# Patient Record
Sex: FEMALE | Race: WHITE | NOT HISPANIC OR LATINO | Employment: OTHER | URBAN - METROPOLITAN AREA
[De-identification: names, ages, dates, MRNs, and addresses within clinical notes are randomized per-mention and may not be internally consistent; named-entity substitution may affect disease eponyms.]

---

## 2022-08-15 ENCOUNTER — TRANSCRIBE ORDERS (OUTPATIENT)
Dept: ADMINISTRATIVE | Facility: HOSPITAL | Age: 72
End: 2022-08-15

## 2022-08-15 ENCOUNTER — HOSPITAL ENCOUNTER (OUTPATIENT)
Dept: CT IMAGING | Facility: HOSPITAL | Age: 72
Discharge: HOME | End: 2022-08-15

## 2022-08-15 ENCOUNTER — APPOINTMENT (OUTPATIENT)
Dept: GENERAL RADIOLOGY | Facility: HOSPITAL | Age: 72
End: 2022-08-15

## 2022-08-15 DIAGNOSIS — R06.02 SHORTNESS OF BREATH: Primary | ICD-10-CM

## 2022-08-15 DIAGNOSIS — R06.02 SHORTNESS OF BREATH: ICD-10-CM

## 2022-08-15 LAB
ALBUMIN SERPL-MCNC: 4.2 G/DL (ref 3.5–5.2)
ALBUMIN/GLOB SERPL: 1.1 G/DL
ALP SERPL-CCNC: 53 U/L (ref 39–117)
ALT SERPL W P-5'-P-CCNC: 10 U/L (ref 1–33)
ANION GAP SERPL CALCULATED.3IONS-SCNC: 12 MMOL/L (ref 5–15)
AST SERPL-CCNC: 15 U/L (ref 1–32)
BASOPHILS # BLD AUTO: 0.09 10*3/MM3 (ref 0–0.2)
BASOPHILS NFR BLD AUTO: 0.7 % (ref 0–1.5)
BILIRUB SERPL-MCNC: 0.2 MG/DL (ref 0–1.2)
BUN SERPL-MCNC: 48 MG/DL (ref 8–23)
BUN/CREAT SERPL: 21.2 (ref 7–25)
CALCIUM SPEC-SCNC: 10.3 MG/DL (ref 8.6–10.5)
CHLORIDE SERPL-SCNC: 101 MMOL/L (ref 98–107)
CO2 SERPL-SCNC: 25 MMOL/L (ref 22–29)
CREAT SERPL-MCNC: 2.26 MG/DL (ref 0.57–1)
DEPRECATED RDW RBC AUTO: 39 FL (ref 37–54)
EGFRCR SERPLBLD CKD-EPI 2021: 22.7 ML/MIN/1.73
EOSINOPHIL # BLD AUTO: 0.05 10*3/MM3 (ref 0–0.4)
EOSINOPHIL NFR BLD AUTO: 0.4 % (ref 0.3–6.2)
ERYTHROCYTE [DISTWIDTH] IN BLOOD BY AUTOMATED COUNT: 11.9 % (ref 12.3–15.4)
GLOBULIN UR ELPH-MCNC: 3.7 GM/DL
GLUCOSE SERPL-MCNC: 134 MG/DL (ref 65–99)
HCT VFR BLD AUTO: 37.1 % (ref 34–46.6)
HGB BLD-MCNC: 12.4 G/DL (ref 12–15.9)
HOLD SPECIMEN: NORMAL
HOLD SPECIMEN: NORMAL
IMM GRANULOCYTES # BLD AUTO: 0.03 10*3/MM3 (ref 0–0.05)
IMM GRANULOCYTES NFR BLD AUTO: 0.2 % (ref 0–0.5)
LYMPHOCYTES # BLD AUTO: 5.02 10*3/MM3 (ref 0.7–3.1)
LYMPHOCYTES NFR BLD AUTO: 38.6 % (ref 19.6–45.3)
MAGNESIUM SERPL-MCNC: 1.8 MG/DL (ref 1.6–2.4)
MCH RBC QN AUTO: 30 PG (ref 26.6–33)
MCHC RBC AUTO-ENTMCNC: 33.4 G/DL (ref 31.5–35.7)
MCV RBC AUTO: 89.8 FL (ref 79–97)
MONOCYTES # BLD AUTO: 0.94 10*3/MM3 (ref 0.1–0.9)
MONOCYTES NFR BLD AUTO: 7.2 % (ref 5–12)
NEUTROPHILS NFR BLD AUTO: 52.9 % (ref 42.7–76)
NEUTROPHILS NFR BLD AUTO: 6.87 10*3/MM3 (ref 1.7–7)
NRBC BLD AUTO-RTO: 0 /100 WBC (ref 0–0.2)
PLATELET # BLD AUTO: 474 10*3/MM3 (ref 140–450)
PMV BLD AUTO: 8.5 FL (ref 6–12)
POTASSIUM SERPL-SCNC: 4.8 MMOL/L (ref 3.5–5.2)
PROT SERPL-MCNC: 7.9 G/DL (ref 6–8.5)
RBC # BLD AUTO: 4.13 10*6/MM3 (ref 3.77–5.28)
SODIUM SERPL-SCNC: 138 MMOL/L (ref 136–145)
TROPONIN T SERPL-MCNC: <0.01 NG/ML (ref 0–0.03)
WBC NRBC COR # BLD: 13 10*3/MM3 (ref 3.4–10.8)
WHOLE BLOOD HOLD COAG: NORMAL
WHOLE BLOOD HOLD SPECIMEN: NORMAL

## 2022-08-15 PROCEDURE — 83735 ASSAY OF MAGNESIUM: CPT

## 2022-08-15 PROCEDURE — 99284 EMERGENCY DEPT VISIT MOD MDM: CPT

## 2022-08-15 PROCEDURE — 80053 COMPREHEN METABOLIC PANEL: CPT

## 2022-08-15 PROCEDURE — 85025 COMPLETE CBC W/AUTO DIFF WBC: CPT

## 2022-08-15 PROCEDURE — 84484 ASSAY OF TROPONIN QUANT: CPT

## 2022-08-15 PROCEDURE — 71045 X-RAY EXAM CHEST 1 VIEW: CPT

## 2022-08-15 PROCEDURE — 93005 ELECTROCARDIOGRAM TRACING: CPT | Performed by: EMERGENCY MEDICINE

## 2022-08-15 PROCEDURE — 93005 ELECTROCARDIOGRAM TRACING: CPT

## 2022-08-15 PROCEDURE — 82565 ASSAY OF CREATININE: CPT

## 2022-08-15 PROCEDURE — 71250 CT THORAX DX C-: CPT

## 2022-08-15 RX ORDER — SODIUM CHLORIDE 0.9 % (FLUSH) 0.9 %
10 SYRINGE (ML) INJECTION AS NEEDED
Status: DISCONTINUED | OUTPATIENT
Start: 2022-08-15 | End: 2022-08-17 | Stop reason: HOSPADM

## 2022-08-16 ENCOUNTER — APPOINTMENT (OUTPATIENT)
Dept: NUCLEAR MEDICINE | Facility: HOSPITAL | Age: 72
End: 2022-08-16

## 2022-08-16 ENCOUNTER — HOSPITAL ENCOUNTER (OUTPATIENT)
Facility: HOSPITAL | Age: 72
Setting detail: OBSERVATION
LOS: 1 days | Discharge: HOME | End: 2022-08-17
Attending: EMERGENCY MEDICINE | Admitting: INTERNAL MEDICINE

## 2022-08-16 DIAGNOSIS — N17.9 AKI (ACUTE KIDNEY INJURY): Primary | ICD-10-CM

## 2022-08-16 PROBLEM — C34.90 LUNG CANCER: Status: ACTIVE | Noted: 2022-08-16

## 2022-08-16 PROBLEM — I10 ESSENTIAL HYPERTENSION: Status: ACTIVE | Noted: 2022-08-16

## 2022-08-16 PROBLEM — R79.89 ELEVATED D-DIMER: Status: ACTIVE | Noted: 2022-08-16

## 2022-08-16 PROBLEM — K21.9 GERD WITHOUT ESOPHAGITIS: Status: ACTIVE | Noted: 2022-08-16

## 2022-08-16 PROBLEM — I25.10 CORONARY ARTERY DISEASE: Status: ACTIVE | Noted: 2022-08-16

## 2022-08-16 PROBLEM — R91.8 LUNG MASS: Status: ACTIVE | Noted: 2022-08-16

## 2022-08-16 PROBLEM — E11.9 TYPE 2 DIABETES MELLITUS: Status: ACTIVE | Noted: 2022-08-16

## 2022-08-16 PROBLEM — R91.1 LUNG NODULE: Status: ACTIVE | Noted: 2022-08-16

## 2022-08-16 PROBLEM — R53.83 FATIGUE: Status: ACTIVE | Noted: 2022-08-16

## 2022-08-16 PROBLEM — E78.5 HYPERLIPIDEMIA: Status: ACTIVE | Noted: 2022-08-16

## 2022-08-16 LAB
ANION GAP SERPL CALCULATED.3IONS-SCNC: 11 MMOL/L (ref 5–15)
BACTERIA UR QL AUTO: ABNORMAL /HPF
BASOPHILS # BLD AUTO: 0.08 10*3/MM3 (ref 0–0.2)
BASOPHILS NFR BLD AUTO: 0.7 % (ref 0–1.5)
BILIRUB UR QL STRIP: NEGATIVE
BUN SERPL-MCNC: 41 MG/DL (ref 8–23)
BUN/CREAT SERPL: 22.8 (ref 7–25)
CALCIUM SPEC-SCNC: 9.5 MG/DL (ref 8.6–10.5)
CHLORIDE SERPL-SCNC: 101 MMOL/L (ref 98–107)
CLARITY UR: CLEAR
CO2 SERPL-SCNC: 23 MMOL/L (ref 22–29)
COLOR UR: YELLOW
CREAT SERPL-MCNC: 1.8 MG/DL (ref 0.57–1)
CREAT UR-MCNC: 82.1 MG/DL
D-LACTATE SERPL-SCNC: 1.5 MMOL/L (ref 0.5–2)
DEPRECATED RDW RBC AUTO: 39.2 FL (ref 37–54)
EGFRCR SERPLBLD CKD-EPI 2021: 29.8 ML/MIN/1.73
EOSINOPHIL # BLD AUTO: 0.03 10*3/MM3 (ref 0–0.4)
EOSINOPHIL NFR BLD AUTO: 0.3 % (ref 0.3–6.2)
ERYTHROCYTE [DISTWIDTH] IN BLOOD BY AUTOMATED COUNT: 11.8 % (ref 12.3–15.4)
FLUAV SUBTYP SPEC NAA+PROBE: NOT DETECTED
FLUBV RNA ISLT QL NAA+PROBE: NOT DETECTED
GLUCOSE BLDC GLUCOMTR-MCNC: 127 MG/DL (ref 70–130)
GLUCOSE BLDC GLUCOMTR-MCNC: 134 MG/DL (ref 70–130)
GLUCOSE BLDC GLUCOMTR-MCNC: 137 MG/DL (ref 70–130)
GLUCOSE SERPL-MCNC: 147 MG/DL (ref 65–99)
GLUCOSE UR STRIP-MCNC: NEGATIVE MG/DL
HBA1C MFR BLD: 6.9 % (ref 4.8–5.6)
HCT VFR BLD AUTO: 34.7 % (ref 34–46.6)
HGB BLD-MCNC: 11.5 G/DL (ref 12–15.9)
HGB UR QL STRIP.AUTO: NEGATIVE
HOLD SPECIMEN: NORMAL
HYALINE CASTS UR QL AUTO: ABNORMAL /LPF
IMM GRANULOCYTES # BLD AUTO: 0.05 10*3/MM3 (ref 0–0.05)
IMM GRANULOCYTES NFR BLD AUTO: 0.5 % (ref 0–0.5)
KETONES UR QL STRIP: NEGATIVE
LEUKOCYTE ESTERASE UR QL STRIP.AUTO: ABNORMAL
LYMPHOCYTES # BLD AUTO: 4.21 10*3/MM3 (ref 0.7–3.1)
LYMPHOCYTES NFR BLD AUTO: 37.9 % (ref 19.6–45.3)
MCH RBC QN AUTO: 30.1 PG (ref 26.6–33)
MCHC RBC AUTO-ENTMCNC: 33.1 G/DL (ref 31.5–35.7)
MCV RBC AUTO: 90.8 FL (ref 79–97)
MONOCYTES # BLD AUTO: 0.79 10*3/MM3 (ref 0.1–0.9)
MONOCYTES NFR BLD AUTO: 7.1 % (ref 5–12)
NEUTROPHILS NFR BLD AUTO: 5.94 10*3/MM3 (ref 1.7–7)
NEUTROPHILS NFR BLD AUTO: 53.5 % (ref 42.7–76)
NITRITE UR QL STRIP: NEGATIVE
NRBC BLD AUTO-RTO: 0 /100 WBC (ref 0–0.2)
PH UR STRIP.AUTO: <=5 [PH] (ref 5–8)
PLATELET # BLD AUTO: 414 10*3/MM3 (ref 140–450)
PMV BLD AUTO: 8.6 FL (ref 6–12)
POTASSIUM SERPL-SCNC: 4.6 MMOL/L (ref 3.5–5.2)
PROCALCITONIN SERPL-MCNC: 0.04 NG/ML (ref 0–0.25)
PROT UR QL STRIP: NEGATIVE
QT INTERVAL: 366 MS
QTC INTERVAL: 442 MS
RBC # BLD AUTO: 3.82 10*6/MM3 (ref 3.77–5.28)
RBC # UR STRIP: ABNORMAL /HPF
REF LAB TEST METHOD: ABNORMAL
SARS-COV-2 RNA PNL SPEC NAA+PROBE: NOT DETECTED
SODIUM SERPL-SCNC: 135 MMOL/L (ref 136–145)
SODIUM UR-SCNC: 46 MMOL/L
SP GR UR STRIP: 1.01 (ref 1–1.03)
SQUAMOUS #/AREA URNS HPF: ABNORMAL /HPF
TROPONIN T SERPL-MCNC: <0.01 NG/ML (ref 0–0.03)
TSH SERPL DL<=0.05 MIU/L-ACNC: 0.87 UIU/ML (ref 0.27–4.2)
UROBILINOGEN UR QL STRIP: ABNORMAL
UUN 24H UR-MCNC: 559 MG/DL
WBC # UR STRIP: ABNORMAL /HPF
WBC NRBC COR # BLD: 11.1 10*3/MM3 (ref 3.4–10.8)

## 2022-08-16 PROCEDURE — 78580 LUNG PERFUSION IMAGING: CPT

## 2022-08-16 PROCEDURE — 84484 ASSAY OF TROPONIN QUANT: CPT | Performed by: INTERNAL MEDICINE

## 2022-08-16 PROCEDURE — 93010 ELECTROCARDIOGRAM REPORT: CPT | Performed by: INTERNAL MEDICINE

## 2022-08-16 PROCEDURE — 85025 COMPLETE CBC W/AUTO DIFF WBC: CPT | Performed by: PHYSICIAN ASSISTANT

## 2022-08-16 PROCEDURE — 99219 PR INITIAL OBSERVATION CARE/DAY 50 MINUTES: CPT | Performed by: INTERNAL MEDICINE

## 2022-08-16 PROCEDURE — G0378 HOSPITAL OBSERVATION PER HR: HCPCS

## 2022-08-16 PROCEDURE — 81001 URINALYSIS AUTO W/SCOPE: CPT

## 2022-08-16 PROCEDURE — 82962 GLUCOSE BLOOD TEST: CPT

## 2022-08-16 PROCEDURE — 84540 ASSAY OF URINE/UREA-N: CPT | Performed by: INTERNAL MEDICINE

## 2022-08-16 PROCEDURE — A9540 TC99M MAA: HCPCS | Performed by: INTERNAL MEDICINE

## 2022-08-16 PROCEDURE — 83036 HEMOGLOBIN GLYCOSYLATED A1C: CPT | Performed by: PHYSICIAN ASSISTANT

## 2022-08-16 PROCEDURE — 0 TECHNETIUM ALBUMIN AGGREGATED: Performed by: INTERNAL MEDICINE

## 2022-08-16 PROCEDURE — 93005 ELECTROCARDIOGRAM TRACING: CPT | Performed by: INTERNAL MEDICINE

## 2022-08-16 PROCEDURE — 51798 US URINE CAPACITY MEASURE: CPT

## 2022-08-16 PROCEDURE — 84145 PROCALCITONIN (PCT): CPT | Performed by: PHYSICIAN ASSISTANT

## 2022-08-16 PROCEDURE — 83605 ASSAY OF LACTIC ACID: CPT | Performed by: PHYSICIAN ASSISTANT

## 2022-08-16 PROCEDURE — 82570 ASSAY OF URINE CREATININE: CPT | Performed by: INTERNAL MEDICINE

## 2022-08-16 PROCEDURE — 87636 SARSCOV2 & INF A&B AMP PRB: CPT | Performed by: PHYSICIAN ASSISTANT

## 2022-08-16 PROCEDURE — 97161 PT EVAL LOW COMPLEX 20 MIN: CPT

## 2022-08-16 PROCEDURE — 80048 BASIC METABOLIC PNL TOTAL CA: CPT | Performed by: PHYSICIAN ASSISTANT

## 2022-08-16 PROCEDURE — 84443 ASSAY THYROID STIM HORMONE: CPT | Performed by: INTERNAL MEDICINE

## 2022-08-16 PROCEDURE — 84300 ASSAY OF URINE SODIUM: CPT | Performed by: INTERNAL MEDICINE

## 2022-08-16 RX ORDER — FENOFIBRATE 145 MG/1
145 TABLET, COATED ORAL DAILY
Status: DISCONTINUED | OUTPATIENT
Start: 2022-08-16 | End: 2022-08-16

## 2022-08-16 RX ORDER — NICOTINE POLACRILEX 4 MG
15 LOZENGE BUCCAL
Status: DISCONTINUED | OUTPATIENT
Start: 2022-08-16 | End: 2022-08-16 | Stop reason: SDUPTHER

## 2022-08-16 RX ORDER — DEXTROSE MONOHYDRATE 25 G/50ML
25 INJECTION, SOLUTION INTRAVENOUS
Status: DISCONTINUED | OUTPATIENT
Start: 2022-08-16 | End: 2022-08-16 | Stop reason: SDUPTHER

## 2022-08-16 RX ORDER — ROSUVASTATIN CALCIUM 20 MG/1
20 TABLET, COATED ORAL DAILY
Status: DISCONTINUED | OUTPATIENT
Start: 2022-08-16 | End: 2022-08-17 | Stop reason: HOSPADM

## 2022-08-16 RX ORDER — HYDROCHLOROTHIAZIDE 25 MG/1
25 TABLET ORAL DAILY
COMMUNITY
End: 2022-08-17 | Stop reason: HOSPADM

## 2022-08-16 RX ORDER — PANTOPRAZOLE SODIUM 40 MG/1
40 TABLET, DELAYED RELEASE ORAL DAILY
COMMUNITY

## 2022-08-16 RX ORDER — DEXTROSE MONOHYDRATE 25 G/50ML
25 INJECTION, SOLUTION INTRAVENOUS
Status: DISCONTINUED | OUTPATIENT
Start: 2022-08-16 | End: 2022-08-17 | Stop reason: HOSPADM

## 2022-08-16 RX ORDER — SODIUM CHLORIDE 0.9 % (FLUSH) 0.9 %
10 SYRINGE (ML) INJECTION AS NEEDED
Status: DISCONTINUED | OUTPATIENT
Start: 2022-08-16 | End: 2022-08-17 | Stop reason: HOSPADM

## 2022-08-16 RX ORDER — SODIUM CHLORIDE 0.9 % (FLUSH) 0.9 %
10 SYRINGE (ML) INJECTION EVERY 12 HOURS SCHEDULED
Status: DISCONTINUED | OUTPATIENT
Start: 2022-08-16 | End: 2022-08-17 | Stop reason: HOSPADM

## 2022-08-16 RX ORDER — CHOLECALCIFEROL (VITAMIN D3) 125 MCG
5 CAPSULE ORAL NIGHTLY PRN
Status: DISCONTINUED | OUTPATIENT
Start: 2022-08-16 | End: 2022-08-17 | Stop reason: HOSPADM

## 2022-08-16 RX ORDER — ASPIRIN 81 MG/1
81 TABLET, CHEWABLE ORAL DAILY
Status: DISCONTINUED | OUTPATIENT
Start: 2022-08-16 | End: 2022-08-17 | Stop reason: HOSPADM

## 2022-08-16 RX ORDER — PANTOPRAZOLE SODIUM 40 MG/1
40 TABLET, DELAYED RELEASE ORAL DAILY
Status: DISCONTINUED | OUTPATIENT
Start: 2022-08-16 | End: 2022-08-17 | Stop reason: HOSPADM

## 2022-08-16 RX ORDER — ASPIRIN 81 MG/1
81 TABLET, CHEWABLE ORAL DAILY
COMMUNITY

## 2022-08-16 RX ORDER — LOSARTAN POTASSIUM 50 MG/1
50 TABLET ORAL DAILY
COMMUNITY

## 2022-08-16 RX ORDER — FENOFIBRATE 145 MG/1
145 TABLET, COATED ORAL DAILY
COMMUNITY

## 2022-08-16 RX ORDER — INSULIN LISPRO 100 [IU]/ML
0-9 INJECTION, SOLUTION INTRAVENOUS; SUBCUTANEOUS
Status: DISCONTINUED | OUTPATIENT
Start: 2022-08-16 | End: 2022-08-16 | Stop reason: SDUPTHER

## 2022-08-16 RX ORDER — NICOTINE POLACRILEX 4 MG
15 LOZENGE BUCCAL
Status: DISCONTINUED | OUTPATIENT
Start: 2022-08-16 | End: 2022-08-17 | Stop reason: HOSPADM

## 2022-08-16 RX ORDER — SODIUM CHLORIDE 9 MG/ML
75 INJECTION, SOLUTION INTRAVENOUS CONTINUOUS
Status: DISCONTINUED | OUTPATIENT
Start: 2022-08-16 | End: 2022-08-17

## 2022-08-16 RX ORDER — LEVOFLOXACIN 750 MG/1
750 TABLET ORAL DAILY
COMMUNITY
End: 2022-08-17 | Stop reason: HOSPADM

## 2022-08-16 RX ORDER — ROSUVASTATIN CALCIUM 20 MG/1
20 TABLET, COATED ORAL DAILY
COMMUNITY

## 2022-08-16 RX ORDER — ACETAMINOPHEN 325 MG/1
650 TABLET ORAL EVERY 4 HOURS PRN
Status: DISCONTINUED | OUTPATIENT
Start: 2022-08-16 | End: 2022-08-17 | Stop reason: HOSPADM

## 2022-08-16 RX ORDER — TAMSULOSIN HYDROCHLORIDE 0.4 MG/1
1 CAPSULE ORAL DAILY
COMMUNITY

## 2022-08-16 RX ORDER — INSULIN LISPRO 100 [IU]/ML
0-7 INJECTION, SOLUTION INTRAVENOUS; SUBCUTANEOUS
Status: DISCONTINUED | OUTPATIENT
Start: 2022-08-16 | End: 2022-08-17 | Stop reason: HOSPADM

## 2022-08-16 RX ADMIN — PANTOPRAZOLE SODIUM 40 MG: 40 TABLET, DELAYED RELEASE ORAL at 09:18

## 2022-08-16 RX ADMIN — KIT FOR THE PREPARATION OF TECHNETIUM TC 99M ALBUMIN AGGREGATED 1 DOSE: 2.5 INJECTION, POWDER, FOR SOLUTION INTRAVENOUS at 10:24

## 2022-08-16 RX ADMIN — ASPIRIN 81 MG CHEWABLE TABLET 81 MG: 81 TABLET CHEWABLE at 09:18

## 2022-08-16 RX ADMIN — ROSUVASTATIN CALCIUM 20 MG: 20 TABLET, FILM COATED ORAL at 09:18

## 2022-08-16 RX ADMIN — SODIUM CHLORIDE 75 ML/HR: 9 INJECTION, SOLUTION INTRAVENOUS at 04:57

## 2022-08-16 RX ADMIN — SODIUM CHLORIDE 1000 ML: 9 INJECTION, SOLUTION INTRAVENOUS at 01:59

## 2022-08-16 NOTE — PROGRESS NOTES
[facility]  ADMISSION FOLLOW-UP NOTE          Patient admitted after midnight, H&P by my partner performed earlier on today's date reviewed.  Interim findings, labs, and charting also reviewed.        The [facility] Problem List has been managed and updated to include any new diagnoses:  Active Hospital Problems    Diagnosis  POA   • **MORENITA (acute kidney injury) (HCC) [N17.9]  Yes     Priority: Medium   • Type 2 diabetes mellitus (HCC) [E11.9]  Yes   • Essential hypertension [I10]  Yes   • Hyperlipidemia [E78.5]  Yes   • Elevated d-dimer [R79.89]  Yes   • Coronary artery disease [I25.10]  Yes   • Lung cancer (HCC) [C34.90]  No   • Fatigue [R53.83]  Yes   • Lung nodule seen on CT 8/15/22, rec 3 month f/u  [R91.1]  Yes      Resolved Hospital Problems   No resolved problems to display.         ADDITIONAL PLAN:  - detailed assessment and plan from admission reviewed  -See history and physical dated today for full details.  She is a 71-year-old female with a history of type 2 diabetes, hypertension, prior lung cancer status post right middle lobectomy who presented from primary care office with report of acute renal failure and elevated D-dimer.  Recently placed on Levaquin for possible UTI (data deficit) and took 4 doses but not her fifth because she had some associated nausea and vomiting.  Upon reevaluation by her primary care physician she was noted to have some fatigue and possible shortness of breath and sounds like blood work was checked which showed a creatinine of 3 and an elevated D-dimer and was sent to the emergency room.  -On arrival here creatinine was 2.2, and is down to 1.8 today after some IV fluids.  D-dimer minimally elevated at 0.53.  My clinical suspicion is low for VTE, however is reasonable to proceed with VQ scan to rule out.  -Plan will be to continue IV fluids and get VQ scan today, recheck BMP in the morning.  I think okay to cancel  echocardiogram.  Plan discharge in the morning if continues to feel well and creatinine is further normalized.  -Discussed with patient and , all questions answered.    Albert Conde MD  08/16/22

## 2022-08-16 NOTE — PROGRESS NOTES
"                    Clinical Nutrition       Patient Name: Laina Thompson  YOB: 1950  MRN: [id number]  Date of Encounter: 08/16/22 15:56 EDT  Admission date: 8/16/2022      Reason for Visit   Identified at risk by screening criteria, MST score 2+      EMR  Reviewed   Yes    Height: Height: 167.6 cm (66\")  Weight: Weight: 68 kg (150 lb) (08/15/22 2005)  BMI: BMI (Calculated): 24.2    Problem:    MORENITA (acute kidney injury) (HCC)    Type 2 diabetes mellitus (HCC)    Essential hypertension    Hyperlipidemia    Elevated d-dimer    Coronary artery disease    Lung cancer (HCC)    Fatigue    Lung nodule seen on CT 8/15/22, rec 3 month f/u        Reported/Observed/Food/Nutrition Related - Comments     Patient admitted due to acute renal failure and elevated D-dimer.  Poor intake, nausea and vomiting x 1 week while of abx for recent UTI.  Patient reports nausea and vomiting have subsided. She tolerated her lunch meal.  Kitchen staff has been reviewing menu options with patient.  Denies any recent weight changes.      Current Nutrition Prescription     Diet Regular; Consistent Carbohydrate  No active supplement orders    Average Intake from Charting: isud data     Nutrition Diagnosis     Problem No nutrition diagnosis at this time   Etiology    Signs/Symptoms    Status:    Actions     Follow treatment progress, Care plan reviewed, Interview for preferences, Menu provided    Monitor Per Protocol      Lizbet Pisano RD,   Time Spent: 15min          "

## 2022-08-16 NOTE — THERAPY DISCHARGE NOTE
Patient Name: Laina Thompson  : 1950    MRN: [id number]                              Today's Date: 2022       Admit Date: 2022    Visit Dx:     ICD-10-CM ICD-9-CM   1. MORENITA (acute kidney injury) (HCC)  N17.9 584.9     Patient Active Problem List   Diagnosis   • Type 2 diabetes mellitus (HCC)   • Essential hypertension   • Hyperlipidemia   • GERD without esophagitis   • MORENITA (acute kidney injury) (HCC)   • Elevated d-dimer   • Coronary artery disease   • Lung cancer (HCC)   • Fatigue   • Lung nodule seen on CT 8/15/22, rec 3 month f/u      Past Medical History:   Diagnosis Date   • Diabetes (HCC)    • GERD (gastroesophageal reflux disease)    • Hypertension      Past Surgical History:   Procedure Laterality Date   • APPENDECTOMY     • CHOLECYSTECTOMY     • HYSTERECTOMY     • LUNG LOBECTOMY        General Information     Row Name 22 1733          Physical Therapy Time and Intention    Document Type discharge evaluation/summary  -     Mode of Treatment physical therapy  -     Row Name 22 1733          General Information    Patient Profile Reviewed yes  -     Prior Level of Function independent:;all household mobility;community mobility;gait;transfer;bed mobility;ADL's  -     Existing Precautions/Restrictions no known precautions/restrictions  -     Barriers to Rehab none identified  -     Row Name 22 1733          Living Environment    People in Home spouse  -HP     Row Name 22 1733          Home Main Entrance    Number of Stairs, Main Entrance none  -HP     Row Name 22 1733          Stairs Within Home, Primary    Number of Stairs, Within Home, Primary twelve  -     Row Name 22 1733          Cognition    Orientation Status (Cognition) oriented x 3  -HP     Row Name 22 1733          Safety Issues, Functional Mobility    Safety Issues Affecting Function (Mobility) awareness of need for assistance  -           User Key  (r) = Recorded By, (t) =  Taken By, (c) = Cosigned By    Initials Name Provider Type     Jenelle Diaz PT Physical Therapist               Mobility     Row Name 08/16/22 1734          Bed Mobility    Bed Mobility supine-sit;sit-supine  -HP     Supine-Sit [place] (Bed Mobility) modified independence  -HP     Sit-Supine [place] (Bed Mobility) modified independence  -     Row Name 08/16/22 1734          Sit-Stand Transfer    Sit-Stand [place] (Transfers) standby assist  -HP     Comment, (Sit-Stand Transfer) for safety only  -     Row Name 08/16/22 1734          Gait/Stairs (Locomotion)    [place] Level (Gait) standby assist  -HP     Distance in Feet (Gait) 500  -     Comment, (Gait/Stairs) SBA for safety only. No LOB or unsteadiness noted  -           User Key  (r) = Recorded By, (t) = Taken By, (c) = Cosigned By    Initials Name Provider Type     Jenelle Diaz PT Physical Therapist               Obj/Interventions     Row Name 08/16/22 1735          Balance    Balance Assessment sitting static balance;sitting dynamic balance;sit to stand dynamic balance;standing static balance;standing dynamic balance  -     Static Sitting Balance modified independence  -     Dynamic Sitting Balance modified independence  -     Static Standing Balance standby assist  -     Dynamic Standing Balance standby assist  -     Balance Interventions sitting;standing;sit to stand;occupation based/functional task  -           User Key  (r) = Recorded By, (t) = Taken By, (c) = Cosigned By    Initials Name Provider Type     Jenelle Diaz PT Physical Therapist               Goals/Plan    No documentation.                Clinical Impression     Row Name 08/16/22 1735          Pain    Pretreatment Pain Rating 0/10 - no pain  -     Posttreatment Pain Rating 0/10 - no pain  -     Row Name 08/16/22 1735          Plan of Care Review    Plan of Care Reviewed With patient  -     Progress no change  -     Outcome Evaluation  PT eval complete. Pt demonstrated ability to safely perform bed mobility, amb 500', and navigate 5 steps with no safety concerns. No LOB noted. IPPT services not warranted at this time. Will d/c services. Please reconsult if change in functional mobility occurs. Recommend d/c home with assist.  -     Row Name 08/16/22 1735          Therapy Assessment/Plan (PT)    Criteria for Skilled Interventions Met (PT) no;no problems identified which require skilled intervention  -     Row Name 08/16/22 1735          Vital Signs    Pre Systolic BP Rehab --  VSS  -HP     Pre Patient Position Supine  -HP     Intra Patient Position Standing  -HP     Post Patient Position Supine  -HP     Row Name 08/16/22 1735          Positioning and Restraints    Pre-Treatment Position in bed  -HP     Post Treatment Position bed  -HP     In Bed notified nsg;supine;fowlers;call light within reach;encouraged to call for assist;exit alarm on;with family/caregiver;side rails up x2  -HP           User Key  (r) = Recorded By, (t) = Taken By, (c) = Cosigned By    Initials Name Provider Type    HP Jenelle Diaz, PT Physical Therapist               Outcome Measures     Row Name 08/16/22 1737          How much help from another person do you currently need...    Turning from your back to your side while in flat bed without using bedrails? 4  -HP     Moving from lying on back to sitting on the side of a flat bed without bedrails? 4  -HP     Moving to and from a bed to a chair (including a wheelchair)? 4  -HP     Standing up from a chair using your arms (e.g., wheelchair, bedside chair)? 4  -HP     Climbing 3-5 steps with a railing? 4  -HP     To walk in hospital room? 4  -HP     AM-PAC 6 Clicks Score (PT) 24  -HP     Highest level of mobility 8 --> Walked 250 feet or more  -     Row Name 08/16/22 6737          Functional Assessment    Outcome Measure Options AM-PAC 6 Clicks Basic Mobility (PT)  -HP           User Key  (r) = Recorded By, (t) = Taken  By, (c) = Cosigned By    Initials Name Provider Type     Jenelle Diaz PT Physical Therapist              Physical Therapy Education                 Title: PT OT SLP Therapies (Done)     Topic: Physical Therapy (Done)     Point: Mobility training (Done)     Learning Progress Summary           Patient Acceptance, E,D, VU,DU by  at 8/16/2022 1737   Family Acceptance, E,D, VU,DU by  at 8/16/2022 1737                   Point: Home exercise program (Done)     Learning Progress Summary           Patient Acceptance, E,D, VU,DU by  at 8/16/2022 1737   Family Acceptance, E,D, VU,DU by  at 8/16/2022 1737                   Point: Body mechanics (Done)     Learning Progress Summary           Patient Acceptance, E,D, VU,DU by  at 8/16/2022 1737   Family Acceptance, E,D, VU,DU by  at 8/16/2022 1737                   Point: Precautions (Done)     Learning Progress Summary           Patient Acceptance, E,D, VU,DU by  at 8/16/2022 1737   Family Acceptance, E,D, VU,DU by  at 8/16/2022 1737                               User Key     Initials Effective Dates Name Provider Type Discipline     06/01/21 -  Jenelle Diaz PT Physical Therapist PT              PT Recommendation and Plan     Plan of Care Reviewed With: patient  Progress: no change  Outcome Evaluation: PT eval complete. Pt demonstrated ability to safely perform bed mobility, amb 500', and navigate 5 steps with no safety concerns. No LOB noted. IPPT services not warranted at this time. Will d/c services. Please reconsult if change in functional mobility occurs. Recommend d/c home with assist.     Time Calculation:    PT Charges     Row Name 08/16/22 1715             Time Calculation    Start Time 1715  -HP      PT Received On 08/16/22  -HP      PT Goal Re-Cert Due Date 08/26/22  -HP              Untimed Charges    PT Eval/Re-eval Minutes 40  -HP              Total Minutes    Untimed Charges Total Minutes 40  -HP       Total Minutes 40  -HP             User Key  (r) = Recorded By, (t) = Taken By, (c) = Cosigned By    Initials Name Provider Type     Jenelle Diaz, PT Physical Therapist              Therapy Charges for Today     Code Description Service Date Service Provider Modifiers Qty    [id number] HC PT EVAL LOW COMPLEXITY 3 8/16/2022 Jenelle Diaz, LUCIAN GP 1          PT G-Codes  Outcome Measure Options: AM-PAC 6 Clicks Basic Mobility (PT)  AM-PAC 6 Clicks Score (PT): 24    PT Discharge Summary  Anticipated Discharge Disposition (PT): home with assist    Jenelle Diaz PT  8/16/2022

## 2022-08-16 NOTE — PLAN OF CARE
Goal Outcome Evaluation:           Progress: improving  Outcome Evaluation: VSS, RA, Alert and oriented x 4, no c/o of dizziness and gait is steady.  IVFs continued.  Will continue to monitor and will notify MD STEEN.  Jessika Gibbons RN

## 2022-08-16 NOTE — PLAN OF CARE
Goal Outcome Evaluation:  Plan of Care Reviewed With: patient        Progress: no change  Outcome Evaluation: PT eval complete. Pt demonstrated ability to safely perform bed mobility, amb 500', and navigate 5 steps with no safety concerns. No LOB noted. IPPT services not warranted at this time. Will d/c services. Please reconsult if change in functional mobility occurs. Recommend d/c home with assist.

## 2022-08-16 NOTE — H&P
[facility] Medicine Services  HISTORY AND PHYSICAL    Patient Name: Laina Thompson  : 1950  MRN: [id number]  Primary Care Physician: Graciela Hargrove MD  Date of admission: 2022      Subjective   Subjective     Chief Complaint:  Fatigue, dizziness, SOB       HPI:  Laina Thompson is a 71 y.o. female with a history of CAD, lung cancer s/p RLL lobectomy, T2DM, HTN, HLD, and GERD who presents to [facility] ED for complaint of fatigue and dizziness.    She was seen by PCP last week and dx's with UTI, and was placed on levaquin. She states she almost completed the full course, but was taken off it with one day left d/t continued nausea and vomiting while on it. She presented back to pcp with weakness, fatigue, SOB, and tachycardia. Labs at that time showed an elevated D-dimer. CTA was ordered but unable to be obtained due to elevated creatinine of around 3 which is new for her (last cr on 22 was 1.1). She was then advised to come here for further evaluation. She is chest pain free at this time. Denies fever, chills, cough, abd pain, nausea, vomiting or diarrhea.  She does however state that she has been more short of breath over the last several days, and tends to be worse with exertion.           Review of Systems   Constitutional: Positive for fatigue. Negative for chills, fever and unexpected weight change.   HENT: Negative for nosebleeds, postnasal drip, rhinorrhea, sinus pressure and trouble swallowing.    Eyes: Negative for photophobia and visual disturbance.   Respiratory: Positive for shortness of breath. Negative for cough and wheezing.    Cardiovascular: Negative for chest pain, palpitations and leg swelling.   Gastrointestinal: Negative for abdominal pain, diarrhea, nausea and vomiting.   Genitourinary: Negative for dysuria and hematuria.   Musculoskeletal: Negative for arthralgias and myalgias.   Skin: Negative.    Neurological: Positive for dizziness and  headaches. Negative for tremors, syncope, speech difficulty and weakness.   Psychiatric/Behavioral: Negative for confusion. The patient is not nervous/anxious.          All other systems reviewed and are negative.     Personal History     Past Medical History:   Diagnosis Date   • Diabetes (HCC)    • GERD (gastroesophageal reflux disease)    • Hypertension              Past Surgical History:   Procedure Laterality Date   • APPENDECTOMY     • CHOLECYSTECTOMY     • HYSTERECTOMY     • LUNG LOBECTOMY         Family History:  family history includes Cancer in her father; Diabetes in her father; Heart disease in her father; Hyperlipidemia in her father. Otherwise pertinent FHx was reviewed and unremarkable.     Social History:    Social History     Social History Narrative   • Not on file       Medications:  Available home medication information reviewed.  Medications Prior to Admission   Medication Sig Dispense Refill Last Dose   • aspirin 81 MG chewable tablet Chew 81 mg Daily.   Past Week at Unknown time   • Cyanocobalamin 1000 MCG/ML kit Inject  as directed.   8/15/2022 at Unknown time   • fenofibrate (TRICOR) 145 MG tablet Take 145 mg by mouth Daily.   Past Week at Unknown time   • hydroCHLOROthiazide (HYDRODIURIL) 25 MG tablet Take 25 mg by mouth Daily.   Past Week at Unknown time   • levoFLOXacin (LEVAQUIN) 750 MG tablet Take 750 mg by mouth Daily.   Past Week at Unknown time   • losartan (COZAAR) 50 MG tablet Take 50 mg by mouth Daily.   Past Week at Unknown time   • metFORMIN (GLUCOPHAGE) 500 MG tablet Take 500 mg by mouth 2 (Two) Times a Day With Meals.   Past Week at Unknown time   • pantoprazole (PROTONIX) 40 MG EC tablet Take 40 mg by mouth Daily.   Past Week at Unknown time   • rosuvastatin (CRESTOR) 20 MG tablet Take 20 mg by mouth Daily.   Past Week at Unknown time   • tamsulosin (FLOMAX) 0.4 MG capsule 24 hr capsule Take 1 capsule by mouth Daily.   8/15/2022 at Unknown time       Allergies   Allergen  Reactions   • Codeine Other (See Comments) and GI Intolerance   • Nitrofurantoin Hives   • Sulfamethoxazole-Trimethoprim Rash       Objective   Objective     Vital Signs:   Temp:  [97.6 °F (36.4 °C)] 97.6 °F (36.4 °C)  Heart Rate:  [] 88  Resp:  [14] 14  BP: ()/(48-68) 129/68       Physical Exam  Constitutional:       General: She is not in acute distress.     Appearance: Normal appearance.   HENT:      Head: Atraumatic.      Right Ear: External ear normal.      Left Ear: External ear normal.      Nose: Nose normal.   Eyes:      Extraocular Movements: Extraocular movements intact.      Conjunctiva/sclera: Conjunctivae normal.      Pupils: Pupils are equal, round, and reactive to light.   Cardiovascular:      Rate and Rhythm: Normal rate and regular rhythm.      Pulses: Normal pulses.      Heart sounds: Normal heart sounds. No murmur heard.  Pulmonary:      Effort: Pulmonary effort is normal. No respiratory distress.      Breath sounds: Normal breath sounds. No wheezing, rhonchi or rales.   Abdominal:      General: Bowel sounds are normal. There is no distension.      Tenderness: There is no abdominal tenderness. There is no guarding or rebound.   Musculoskeletal:         General: Normal range of motion.      Cervical back: No rigidity.      Right lower leg: No edema.      Left lower leg: No edema.   Skin:     General: Skin is warm and dry.      Coloration: Skin is not jaundiced.      Findings: No lesion or rash.   Neurological:      General: No focal deficit present.      Mental Status: She is alert and oriented to person, place, and time.   Psychiatric:         Attention and Perception: Attention normal.         Mood and Affect: Mood normal.         Behavior: Behavior normal.         Thought Content: Thought content normal.          Result Review:  I have personally reviewed the results from the time of this admission to 8/16/2022 04:44 EDT and agree with these findings:  [x]  Laboratory list /  accordion  [x]  Microbiology  [x]  Radiology  [x]  EKG/Telemetry   []  Cardiology/Vascular   []  Pathology  []  Old records  []  Other:        LAB RESULTS:      Lab 08/15/22  2043 08/15/22  1253   WBC 13.00*  --    HEMOGLOBIN 12.4  --    HEMATOCRIT 37.1  --    PLATELETS 474*  --    NEUTROS ABS 6.87  --    IMMATURE GRANS (ABS) 0.03  --    LYMPHS ABS 5.02*  --    MONOS ABS 0.94*  --    EOS ABS 0.05  --    MCV 89.8  --    D DIMER QUANT  --  0.53*         Lab 08/15/22  2043   SODIUM 138   POTASSIUM 4.8   CHLORIDE 101   CO2 25.0   ANION GAP 12.0   BUN 48*   CREATININE 2.26*   EGFR 22.7*   GLUCOSE 134*   CALCIUM 10.3   MAGNESIUM 1.8         Lab 08/15/22  2043   TOTAL PROTEIN 7.9   ALBUMIN 4.20   GLOBULIN 3.7   ALT (SGPT) 10   AST (SGOT) 15   BILIRUBIN 0.2   ALK PHOS 53         Lab 08/15/22  2043   TROPONIN T <0.010                 UA    Urinalysis 8/16/22 8/16/22    0019 0019   Squamous Epithelial Cells, UA  3-6 (A)   Specific Gravity, UA 1.015    Ketones, UA Negative    Blood, UA Negative    Leukocytes, UA Small (1+) (A)    Nitrite, UA Negative    RBC, UA  0-2   WBC, UA  6-12 (A)   Bacteria, UA  None Seen   (A) Abnormal value              Microbiology Results (last 10 days)     Procedure Component Value - Date/Time    COVID PRE-OP / PRE-PROCEDURE SCREENING ORDER (NO ISOLATION) - Swab, Nasopharynx [[id number]]  (Normal) Collected: 08/16/22 0259    Lab Status: Final result Specimen: Swab from Nasopharynx Updated: 08/16/22 0330    Narrative:      The following orders were created for panel order COVID PRE-OP / PRE-PROCEDURE SCREENING ORDER (NO ISOLATION) - Swab, Nasopharynx.  Procedure                               Abnormality         Status                     ---------                               -----------         ------                     COVID-19 and FLU A/B PCR...[[id number]]  Normal              Final result                 Please view results for these tests on the individual orders.    COVID-19 and FLU A/B PCR -  Swab, Nasopharynx [[id number]]  (Normal) Collected: 08/16/22 0259    Lab Status: Final result Specimen: Swab from Nasopharynx Updated: 08/16/22 0330     COVID19 Not Detected     Influenza A PCR Not Detected     Influenza B PCR Not Detected    Narrative:      Fact sheet for providers: https://www.fda.gov/media/955681/download    Fact sheet for patients: https://www.fda.gov/media/980346/download    Test performed by PCR.          CT Chest Without Contrast Diagnostic    Result Date: 8/15/2022  DATE OF EXAM: 8/15/2022 5:26 PM  PROCEDURE: CT CHEST WO CONTRAST DIAGNOSTIC-  INDICATIONS: R06.09; R06.02-Shortness of breath today. History of right thoracotomy surgery  COMPARISON: Plain film imaging study chest performed on 02/23/2005 and 01/12/2004  TECHNIQUE: Routine transaxial slices were obtained through the chest without the administration of intravenous contrast. Reconstructed coronal and sagittal images were also obtained. Automated exposure control and iterative construction methods were used.  The radiation dose reduction device was turned on for each scan per the ALARA (As Low as Reasonably Achievable) protocol.  FINDINGS: CT the chest without contrast reveals no evidence of mass or adenopathy in the base the neck or in the periclavicular soft tissues or the axillary soft tissues. No evidence of mass or adenopathy in the mediastinum or in the right or left pulmonary rios. Heart size is mildly enlarged the pulmonary vascularity is mildly increased and this might be caused by mild congestive heart failure. Clinical correlation would BE helpful. No evidence of pericardial effusion or pericardial thickening. No evidence of hiatal hernia. Mild calcified atherosclerotic plaque is seen in the left coronary artery. Surgical clips is seen in the right upper quadrant the abdomen consistent with cholecystectomy. No evidence of liver mass. No evidence of dilatation of the bile ducts. No evidence of mass or adenopathy or ascites  in the abdomen. No evidence of fracture or metastatic disease in the thoracic spine there is a 1.5 cm x 1 cm in size small focal area of increased lung parenchymal density in the anterior medial aspect of the right middle lobe of the long probably benign in caused by small area of inflammation or infection or scarring or granulomas and less likely caused by mass. CT the chest and 3 months would confirm this. There are bilateral diffuse increased lung markings consistent with chronic lung disease      Impression:  1. Small 1.5 cm x 1 symmetric size focal area of increased density in the lung parenchyma in the anterior medial aspect of the right middle lobe along is probably benign in caused by small area of inflammation or infection or scarring or granulomas and less likely caused by mass. Follow-up CT the chest and 3 months would confirm this. 2. Bilateral diffuse increased lung markings consistent with chronic lung disease and possibly mild congestive heart failure. Clinical correlation would BE helpful. 3. The heart is mildly enlarged.  This report was finalized on 8/15/2022 5:58 PM by Chinmay Francis MD.      XR Chest 1 View    Result Date: 8/15/2022  DATE OF EXAM: 8/15/2022 9:08 PM  PROCEDURE: XR CHEST 1 VW-  INDICATIONS: Weak/Dizzy/AMS triage protocol cough and shortness of air today  COMPARISON: No comparisons available.  TECHNIQUE: Single radiographic AP view of the chest was obtained.  FINDINGS: Single frontal view chest reveals heart and mediastinum are normal no evidence of focal infiltrate or pleural effusion or pneumothorax or mass right or left lungs. There is mild chronic-appearing elevation right hemidiaphragm. There is mild chronic-appearing deformity of the posterior lateral aspect of the right sixth rib.      Impression:  1. No acute disease in the chest  This report was finalized on 8/15/2022 9:13 PM by Chinmay Francis MD.            Assessment & Plan   Assessment & Plan     Active  Hospital Problems    Diagnosis  POA   • **MORENITA (acute kidney injury) (HCC) [N17.9]  Yes   • Type 2 diabetes mellitus (HCC) [E11.9]  Yes   • Essential hypertension [I10]  Yes   • Hyperlipidemia [E78.5]  Yes   • GERD without esophagitis [K21.9]  Yes   • Elevated d-dimer [R79.89]  Yes   • Coronary artery disease [I25.10]  Yes   • Lung cancer (HCC) [C34.90]  No   • Fatigue [R53.83]  Unknown   • Lung mass [R91.8]  Unknown       Laina Thompson is a 71 y.o. female with a history of CAD, lung cancer s/p RLL lobectomy, T2DM, HTN, HLD, and GERD who presents to [facility] ED for complaint of fatigue and dizziness. Recenty treated for UTI with Levaquin. Planned for CTA chest to rule out PE d/t elevated D-dimer, but was unable to perform d/t MORENITA.     Elevated D-dimer  MORENITA  -Patient currently stable and in no acute distress. VSS on room air.   -Plan for VQ scan in the am to rule out PE  -Creatinine today 2.26, eGFR 22.7. Likely 2ry to dehydration  -Serum creatinine from 4/2022 was 1.11  -Received 1L NS bolus in the ED.  -Urine studies  -Follow acute urinary retention protocol  -Consider nephrology consult based on creatinine trend  -Given her complaint of worsening exertional SOB, will also obtain an echo in the am.  -Gentle IV fluids  -Monitor on tele.    Fatigue  -EKG, trop neg  -TTE for a.m.  -may need cardiology eval    Leukocytosis  -WBC 13.00.   -UA appears contaminated. Consider repeat via I&O cath.   -Lactate and procal pending    CAD  HTN  HLD  -Chest pain free. Initial troponin negative.  -Hold home Losartan and hctz for now given morenita.  -Continue statin  -Lipid panel in the am    Hx Lung cancer s/p RLL lobectomy  -abnormal area in lung questionable for lung mass, f/u o/p    T2DM  -Blood glucose 134  -Check A1C  -Fingerstick achs. SSI    GERD  -PPI        DVT prophylaxis:  SCDS      CODE STATUS:  Full Code  Code Status and Medical Interventions:   Ordered at: 08/16/22 0438     Code Status (Patient has no pulse and is  not breathing):    CPR (Attempt to Resuscitate)     Medical Interventions (Patient has pulse or is breathing):    Full Support         Cheryl Amaral,   08/16/22        Attending   Admission Attestation       I have performed an independent face-to-face diagnostic evaluation including performing an independent physical examination as documented here.  The documented plan of care above was reviewed and developed with the advanced practice clinician (APC).      Brief Summary Statement:   Laina Thompson is a 71 y.o. female with a PMH significant for lung cancer s/p RLL lobectomy, diabetes mellitus type 2, HTN, HLD, GERD, CAD who comes to the ED due to fatigue and dizziness.  Patient reports onset of symptoms around 1 week ago when she began to experience extreme fatigue, lightheaded sensation and nausea.  She was seen by her PCP this past Thursday where she was diagnosed with a UTI and started on Levaquin.  After beginning Levaquin her nausea became much more severe, tolerating little intake.  She presented back to her PCP today and was noted to have dyspnea on exertion by nursing staff.  D-dimer was obtained and found to be elevated.  She was sent to the ED for CTA chest.  She denies fever, cough, chest pain, dysuria, decreased urine output, frequency.        Remainder of detailed HPI is as noted by APC and has been reviewed and/or edited by me for completeness.    Attending Physical Exam:  Temp:  [97.6 °F (36.4 °C)] 97.6 °F (36.4 °C)  Heart Rate:  [] 88  Resp:  [14] 14  BP: ()/(48-68) 129/68    Constitutional: Awake, alert  Eyes: PERRLA, sclerae anicteric, no conjunctival injection  HENT: NCAT, mucous membranes moist  Neck: Supple, no thyromegaly, no lymphadenopathy, trachea midline  Respiratory: Clear to auscultation bilaterally, nonlabored respirations   Cardiovascular: RRR, no murmurs, rubs, or gallops, palpable pedal pulses bilaterally  Gastrointestinal: Positive bowel sounds, soft, nontender,  nondistended  Musculoskeletal: No bilateral ankle edema, no clubbing or cyanosis to extremities  Psychiatric: Appropriate affect, cooperative  Neurologic: Oriented x 3, strength symmetric in all extremities, Cranial Nerves grossly intact to confrontation, speech clear  Skin: No rashes      Brief Assessment/Plan :  See detailed assessment and plan developed with APC which I have reviewed and/or edited for completeness.    Cheryl Amaral, DO  08/16/22

## 2022-08-16 NOTE — ED PROVIDER NOTES
[place]    EMERGENCY DEPARTMENT ENCOUNTER      Pt Name: Laina Thompson  MRN: [id number]  YOB: 1950  Date of evaluation: 8/15/2022  Provider: DULCE Auguste    CHIEF COMPLAINT       Chief Complaint   Patient presents with   • Weakness - Generalized         HISTORY OF PRESENT ILLNESS  (Location/Symptom, Timing/Onset, Context/Setting, Quality, Duration, Modifying Factors, Severity.)   Laina Thompson is a 71 y.o. female who presents to the emergency department as recommended by her primary Dr. Hargrove for complaints of fatigue, dizziness and intermittent nausea.  Patient saw her primary last week where she was diagnosed with a urinary tract infection.  She was prescribed Levaquin for treatment.  Patient reports taking and completing the course of antibiotics.  She states that she was lethargic all weekend.  Patient noticed that today her blood pressure was low and her heart rate was elevated.  She saw her primary care physician again who ordered a CT of her chest with contrast to rule out a PE.  Upon presentation for her imaging they were unable to complete the exam with contrast as patient was found to have acute kidney injury with serum creatinine of 3.  Patient does report that she is on 2 separate medications losartan and hydrochlorothiazide in order to protect her kidneys as she is diabetic.  Patient's most recent serum creatinine on April 7, 2022 was normal at 1.11.  Patient denies anything specific that makes her symptoms better or worse.  She reports no additional associated symptoms on exam.    HPI   Nursing notes were reviewed.    REVIEW OF SYSTEMS    (2-9 systems for level 4, 10 or more for level 5)   Review of Systems   Constitutional: Positive for activity change and fatigue. Negative for chills and fever.   HENT: Negative.    Respiratory: Positive for shortness of breath.    Cardiovascular: Positive for palpitations.   Gastrointestinal: Positive for abdominal pain, nausea and  vomiting. Negative for anal bleeding, constipation and diarrhea.   Genitourinary: Negative.    Musculoskeletal: Negative.    Skin: Negative.    Neurological: Positive for light-headedness.        All systems reviewed and negative except for those discussed in HPI.   PAST MEDICAL HISTORY     Past Medical History:   Diagnosis Date   • Diabetes (HCC)    • GERD (gastroesophageal reflux disease)    • Hypertension          SURGICAL HISTORY       Past Surgical History:   Procedure Laterality Date   • APPENDECTOMY     • CHOLECYSTECTOMY     • HYSTERECTOMY     • LUNG LOBECTOMY           CURRENT MEDICATIONS       Current Facility-Administered Medications:   •  sodium chloride 0.9 % flush 10 mL, 10 mL, Intravenous, PRN, Donald Romero MD    Current Outpatient Medications:   •  aspirin 81 MG chewable tablet, Chew 81 mg Daily., Disp: , Rfl:   •  Cyanocobalamin 1000 MCG/ML kit, Inject  as directed., Disp: , Rfl:   •  fenofibrate (TRICOR) 145 MG tablet, Take 145 mg by mouth Daily., Disp: , Rfl:   •  hydroCHLOROthiazide (HYDRODIURIL) 25 MG tablet, Take 25 mg by mouth Daily., Disp: , Rfl:   •  levoFLOXacin (LEVAQUIN) 750 MG tablet, Take 750 mg by mouth Daily., Disp: , Rfl:   •  losartan (COZAAR) 50 MG tablet, Take 50 mg by mouth Daily., Disp: , Rfl:   •  metFORMIN (GLUCOPHAGE) 500 MG tablet, Take 500 mg by mouth 2 (Two) Times a Day With Meals., Disp: , Rfl:   •  pantoprazole (PROTONIX) 40 MG EC tablet, Take 40 mg by mouth Daily., Disp: , Rfl:   •  rosuvastatin (CRESTOR) 20 MG tablet, Take 20 mg by mouth Daily., Disp: , Rfl:   •  tamsulosin (FLOMAX) 0.4 MG capsule 24 hr capsule, Take 1 capsule by mouth Daily., Disp: , Rfl:     ALLERGIES     Codeine, Nitrofurantoin, and Sulfamethoxazole-trimethoprim    FAMILY HISTORY       Family History   Problem Relation Age of Onset   • Cancer Father    • Hyperlipidemia Father    • Heart disease Father    • Diabetes Father           SOCIAL HISTORY       Social History     Socioeconomic History    • Marital status:          PHYSICAL EXAM    (up to 7 for level 4, 8 or more for level 5)   Physical Exam  Vitals and nursing note reviewed.   Constitutional:       General: She is not in acute distress.     Appearance: Normal appearance. She is well-developed. She is not ill-appearing or toxic-appearing.   HENT:      Head: Normocephalic and atraumatic.      Nose: Nose normal.      Mouth/Throat:      Mouth: Mucous membranes are moist.   Eyes:      Extraocular Movements: Extraocular movements intact.   Cardiovascular:      Rate and Rhythm: Normal rate and regular rhythm.   Pulmonary:      Effort: Pulmonary effort is normal. No respiratory distress.      Breath sounds: Normal breath sounds.   Abdominal:      General: There is no distension.   Musculoskeletal:         General: Normal range of motion.      Cervical back: Normal range of motion.   Skin:     General: Skin is warm and dry.   Neurological:      General: No focal deficit present.      Mental Status: She is alert.   Psychiatric:         Behavior: Behavior normal.         Thought Content: Thought content normal.         Judgment: Judgment normal.        DIAGNOSTIC RESULTS     EKG: All EKGs are interpreted by the Emergency Department Physician who either signs or Co-signs this chart in the absence of a cardiologist.    ECG 12 Lead   Preliminary Result   Test Reason : Weak/Dizzy/AMS protocol   Blood Pressure :   */*   mmHG   Vent. Rate :  90 BPM     Atrial Rate :  90 BPM      P-R Int : 152 ms          QRS Dur :  82 ms       QT Int : 370 ms       P-R-T Axes :  54  43  52 degrees      QTc Int : 452 ms      Normal sinus rhythm   Nonspecific T wave abnormality   Abnormal ECG   No previous ECGs available      Referred By:            Confirmed By:           RADIOLOGY:   Non-plain film images such as CT, Ultrasound and MRI are read by the radiologist. Plain radiographic images are visualized and preliminarily interpreted by the emergency physician with the  below findings:      [x] Radiologist's Report Reviewed:  XR Chest 1 View   Final Result       1. No acute disease in the chest       This report was finalized on 8/15/2022 9:13 PM by Chinmay Francis MD.                ED BEDSIDE ULTRASOUND:   Performed by ED Physician - none    LABS:    I have reviewed and interpreted all of the currently available lab results from this visit (if applicable):  Results for orders placed or performed during the hospital encounter of 08/16/22   COVID-19 and FLU A/B PCR - Swab, Nasopharynx    Specimen: Nasopharynx; Swab   Result Value Ref Range    COVID19 Not Detected Not Detected - Ref. Range    Influenza A PCR Not Detected Not Detected    Influenza B PCR Not Detected Not Detected   Comprehensive Metabolic Panel    Specimen: Blood   Result Value Ref Range    Glucose 134 (H) 65 - 99 mg/dL    BUN 48 (H) 8 - 23 mg/dL    Creatinine 2.26 (H) 0.57 - 1.00 mg/dL    Sodium 138 136 - 145 mmol/L    Potassium 4.8 3.5 - 5.2 mmol/L    Chloride 101 98 - 107 mmol/L    CO2 25.0 22.0 - 29.0 mmol/L    Calcium 10.3 8.6 - 10.5 mg/dL    Total Protein 7.9 6.0 - 8.5 g/dL    Albumin 4.20 3.50 - 5.20 g/dL    ALT (SGPT) 10 1 - 33 U/L    AST (SGOT) 15 1 - 32 U/L    Alkaline Phosphatase 53 39 - 117 U/L    Total Bilirubin 0.2 0.0 - 1.2 mg/dL    Globulin 3.7 gm/dL    A/G Ratio 1.1 g/dL    BUN/Creatinine Ratio 21.2 7.0 - 25.0    Anion Gap 12.0 5.0 - 15.0 mmol/L    eGFR 22.7 (L) >60.0 mL/min/1.73   Troponin    Specimen: Blood   Result Value Ref Range    Troponin T <0.010 0.000 - 0.030 ng/mL   Magnesium    Specimen: Blood   Result Value Ref Range    Magnesium 1.8 1.6 - 2.4 mg/dL   Urinalysis With Microscopic If Indicated (No Culture) - Urine, Clean Catch    Specimen: Urine, Clean Catch   Result Value Ref Range    Color, UA Yellow Yellow, Straw    Appearance, UA Clear Clear    pH, UA <=5.0 5.0 - 8.0    Specific Gravity, UA 1.015 1.001 - 1.030    Glucose, UA Negative Negative    Ketones, UA Negative Negative     Bilirubin, UA Negative Negative    Blood, UA Negative Negative    Protein, UA Negative Negative    Leuk Esterase, UA Small (1+) (A) Negative    Nitrite, UA Negative Negative    Urobilinogen, UA 0.2 E.U./dL 0.2 - 1.0 E.U./dL   CBC Auto Differential    Specimen: Blood   Result Value Ref Range    WBC 13.00 (H) 3.40 - 10.80 10*3/mm3    RBC 4.13 3.77 - 5.28 10*6/mm3    Hemoglobin 12.4 12.0 - 15.9 g/dL    Hematocrit 37.1 34.0 - 46.6 %    MCV 89.8 79.0 - 97.0 fL    MCH 30.0 26.6 - 33.0 pg    MCHC 33.4 31.5 - 35.7 g/dL    RDW 11.9 (L) 12.3 - 15.4 %    RDW-SD 39.0 37.0 - 54.0 fl    MPV 8.5 6.0 - 12.0 fL    Platelets 474 (H) 140 - 450 10*3/mm3    Neutrophil % 52.9 42.7 - 76.0 %    Lymphocyte % 38.6 19.6 - 45.3 %    Monocyte % 7.2 5.0 - 12.0 %    Eosinophil % 0.4 0.3 - 6.2 %    Basophil % 0.7 0.0 - 1.5 %    Immature Grans % 0.2 0.0 - 0.5 %    Neutrophils, Absolute 6.87 1.70 - 7.00 10*3/mm3    Lymphocytes, Absolute 5.02 (H) 0.70 - 3.10 10*3/mm3    Monocytes, Absolute 0.94 (H) 0.10 - 0.90 10*3/mm3    Eosinophils, Absolute 0.05 0.00 - 0.40 10*3/mm3    Basophils, Absolute 0.09 0.00 - 0.20 10*3/mm3    Immature Grans, Absolute 0.03 0.00 - 0.05 10*3/mm3    nRBC 0.0 0.0 - 0.2 /100 WBC   Urinalysis, Microscopic Only - Urine, Clean Catch    Specimen: Urine, Clean Catch   Result Value Ref Range    RBC, UA 0-2 None Seen, 0-2 /HPF    WBC, UA 6-12 (A) None Seen, 0-2 /HPF    Bacteria, UA None Seen None Seen, Trace /HPF    Squamous Epithelial Cells, UA 3-6 (A) None Seen, 0-2 /HPF    Hyaline Casts, UA 7-12 0 - 6 /LPF    Methodology Automated Microscopy    ECG 12 Lead   Result Value Ref Range    QT Interval 370 ms    QTC Interval 452 ms   Green Top (Gel)   Result Value Ref Range    Extra Tube Hold for add-ons.    Lavender Top   Result Value Ref Range    Extra Tube hold for add-on    Gold Top - SST   Result Value Ref Range    Extra Tube Hold for add-ons.    Gray Top   Result Value Ref Range    Extra Tube Hold for add-ons.    Light Blue Top  "  Result Value Ref Range    Extra Tube Hold for add-ons.         All other labs were within normal range or not returned as of this dictation.      EMERGENCY DEPARTMENT COURSE and DIFFERENTIAL DIAGNOSIS/MDM:   Vitals:    Vitals:    08/15/22 2005 08/16/22 0203 08/16/22 0325 08/16/22 0424   BP: 99/48 129/59 121/60 121/60   BP Location: Left arm      Patient Position: Sitting Lying Lying Lying   Pulse: 102 80 86 88   Resp: 14      Temp: 97.6 °F (36.4 °C)      TempSrc: Oral      SpO2: 97% 97% 96% 97%   Weight: 68 kg (150 lb)      Height: 167.6 cm (66\")          ED Course as of 08/16/22 0431   Tue Aug 16, 2022   0253 Creatinine(!): 2.26 [JG]   0428 In summary this is a 71-year-old female who presents the emergency room today with complaints of generalized weakness, fatigue and nausea.  Initially seen and treated by her primary care physician for urinary tract infection with Levaquin.  Felt better over the weekend and now presents to ED for further evaluation.  Primary care provider outpatient and was concerned because of patient's complaints of shortness of breath and ordered CTA of her chest was not completed as a result of elevated serum creatinine.  On presentation to the ED this evening patient afebrile, nontoxic in appearance, vital signs stable and maintaining oxygen at 97% on room air.  Serum creatinine of 2.26 significantly elevated over most recent prior serum creatinine of 1.11 in April 2022.  No additional acute or emergent findings demonstrated on physical exam.  Remainder of labs without acute or emergent abnormalities.  EKG without evidence of acute ischemic changes.  Chest x-ray demonstrates no acute cardiopulmonary process.  Hospitalist consulted for admission agreeable to accept patient.  Patient and family at bedside agreeable to plan of care and hospitalization for further evaluation and treatment of her acute kidney injury. [JG]      ED Course User Index  [JG] Peng Main PA       MDM  Number of " Diagnoses or Management Options  MORENITA (acute kidney injury) (HCC): established, worsening     Amount and/or Complexity of Data Reviewed  Clinical lab tests: reviewed  Tests in the radiology section of CPT®: reviewed    Risk of Complications, Morbidity, and/or Mortality  Presenting problems: high  Diagnostic procedures: high  Management options: high    Critical Care  Total time providing critical care: > 105 minutes    Patient Progress  Patient progress: stable           MEDICATIONS ADMINISTERED IN ED:  Medications   sodium chloride 0.9 % flush 10 mL (has no administration in time range)   sodium chloride 0.9 % bolus 1,000 mL (0 mL Intravenous Stopped 8/16/22 0259)       PROCEDURES:  Procedures          CRITICAL CARE TIME    Total Critical Care time was 0 minutes, excluding separately reportable procedures.   There was a high probability of clinically significant/life threatening deterioration in the patient's condition which required my urgent intervention.      FINAL IMPRESSION      1. MORENITA (acute kidney injury) (HCC)          DISPOSITION/PLAN     ED Disposition     ED Disposition   Decision to Admit    Condition   --    Comment   Level of Care: Telemetry [5]   Diagnosis: MORENITA (acute kidney injury) (HCC) [238229]   Admitting Physician: MARICRUZ AMATO [[id number]]   Attending Physician: MARICRUZ AMATO [[id number]]   Certification: I Certify That Inpatient Hospital Services Are Medically Necessary For Greater Than 2 Midnights               Comment: Please note this report has been produced using speech recognition software.      DULCE Auguste Jason C, PA  08/16/22 0431

## 2022-08-17 VITALS
WEIGHT: 150 LBS | HEART RATE: 89 BPM | DIASTOLIC BLOOD PRESSURE: 60 MMHG | TEMPERATURE: 98.2 F | HEIGHT: 66 IN | SYSTOLIC BLOOD PRESSURE: 121 MMHG | BODY MASS INDEX: 24.11 KG/M2 | OXYGEN SATURATION: 93 % | RESPIRATION RATE: 17 BRPM

## 2022-08-17 LAB
GLUCOSE BLDC GLUCOMTR-MCNC: 106 MG/DL (ref 70–130)
QT INTERVAL: 370 MS
QTC INTERVAL: 452 MS

## 2022-08-17 PROCEDURE — G0378 HOSPITAL OBSERVATION PER HR: HCPCS

## 2022-08-17 PROCEDURE — 99217 PR OBSERVATION CARE DISCHARGE MANAGEMENT: CPT | Performed by: INTERNAL MEDICINE

## 2022-08-17 PROCEDURE — 82962 GLUCOSE BLOOD TEST: CPT

## 2022-08-17 RX ADMIN — ACETAMINOPHEN 650 MG: 325 TABLET, FILM COATED ORAL at 08:29

## 2022-08-17 RX ADMIN — PANTOPRAZOLE SODIUM 40 MG: 40 TABLET, DELAYED RELEASE ORAL at 08:08

## 2022-08-17 RX ADMIN — ASPIRIN 81 MG CHEWABLE TABLET 81 MG: 81 TABLET CHEWABLE at 08:08

## 2022-08-17 RX ADMIN — ROSUVASTATIN CALCIUM 20 MG: 20 TABLET, FILM COATED ORAL at 08:08

## 2022-08-17 NOTE — DISCHARGE SUMMARY
[facility] Medicine Services  DISCHARGE SUMMARY    Patient Name: Laina Thompson  : 1950  MRN: [id number]    Date of Admission: 2022  1:00 AM  Date of Discharge:  22  Primary Care Physician: Graciela Hargrove MD    Consults     No orders found for last 30 day(s).          Hospital Course     Presenting Problem:   MORENITA (acute kidney injury) (HCC) [N17.9]    Active Hospital Problems    Diagnosis  POA   • **MORENITA (acute kidney injury) (HCC) [N17.9]  Yes     Priority: Medium   • Type 2 diabetes mellitus (HCC) [E11.9]  Yes   • Essential hypertension [I10]  Yes   • Hyperlipidemia [E78.5]  Yes   • Elevated d-dimer [R79.89]  Yes   • Coronary artery disease [I25.10]  Yes   • Lung cancer (HCC) [C34.90]  No   • Fatigue [R53.83]  Yes   • Lung nodule seen on CT 8/15/22, rec 3 month f/u  [R91.1]  Yes      Resolved Hospital Problems   No resolved problems to display.          Hospital Course:  Laina Thompson is a 71 y.o. female with a history of type 2 diabetes, hypertension, hyperlipidemia, prior lung cancer status post right lower lobectomy presenting to [facility] with complaints of fatigue and dizziness and abnormal labs.  Diagnosed with UTI last week by primary care physician (data deficit) and was placed on Levaquin.  She had some significant nausea and vomiting with that and took 4 of the 5 doses.  On follow-up was noted to be fatigued and have some shortness of breath blood work obtained by primary care physician reportedly showed an elevated D-dimer and a creatinine of 3, baseline was 1.1 in April.  She was directed to the emergency room for further evaluation.  Here creatinine was found to be 2.3 and D-dimer was minimally elevated at 0.53.  She was admitted for further evaluation.  Creatinine responded nicely to IV fluids and the next day was already down to 1.80.  Most likely prerenal state related to dehydration from nausea and vomiting which corrected  after fluids.  Recommend stopping HCTZ at this time.  Okay to resume Cozaar.  I would like her to follow-up with primary care physician with a BMP in 1 week to ensure complete resolution.   Regards to her minimally elevated D-dimer a VQ scan was done given her elevated creatinine, this was read as low probability.  Of note the outpatient chest CT done showed some questionable nodularity right middle lung site and recommends a 3-month follow-up.  However this may be somewhat chronic related to her prior surgery and will defer to primary care physician for further determination.  Discussed with patient.  Day of discharge she is back to baseline and anxious to get home.  She will follow-up with primary care physician with a BMP in 1 week.      Discharge Follow Up Recommendations for outpatient labs/diagnostics:  Follow-up with primary care physician in 1 week with a BMP  Repeat CT scan of chest to follow-up right middle lobe nodularity    Day of Discharge     HPI:    present.  No issues overnight.  She feels completely back to baseline and is anxious to get home.    Review of Systems  Gen- No fevers, chills  CV- No chest pain, palpitations  Resp- No cough, dyspnea  GI- No N/V/D, abd pain    Vital Signs:   Temp:  [97.5 °F (36.4 °C)-98 °F (36.7 °C)] 97.7 °F (36.5 °C)  Heart Rate:  [71-89] 89  Resp:  [16] 16  BP: (110-125)/(50-61) 125/61      Physical Exam:  Constitutional: No acute distress, awake, alert, sitting up in bed and feeding herself breakfast  HENT: NCAT, mucous membranes moist  Respiratory: Clear to auscultation bilaterally, respiratory effort normal on room air  Cardiovascular: RRR, no murmurs, rubs, or gallops  Gastrointestinal: Positive bowel sounds, soft, nontender, nondistended  Musculoskeletal: No bilateral ankle edema  Psychiatric: Appropriate affect, cooperative  Neurologic: Oriented x 3, no deficits  Skin: No rashes      Pertinent  and/or Most Recent Results     LAB RESULTS:      Lab  08/16/22  0501 08/15/22  2043 08/15/22  1253   WBC 11.10* 13.00*  --    HEMOGLOBIN 11.5* 12.4  --    HEMATOCRIT 34.7 37.1  --    PLATELETS 414 474*  --    NEUTROS ABS 5.94 6.87  --    IMMATURE GRANS (ABS) 0.05 0.03  --    LYMPHS ABS 4.21* 5.02*  --    MONOS ABS 0.79 0.94*  --    EOS ABS 0.03 0.05  --    MCV 90.8 89.8  --    PROCALCITONIN 0.04  --   --    LACTATE 1.5  --   --    D DIMER QUANT  --   --  0.53*         Lab 08/16/22  0501 08/15/22  2043   SODIUM 135* 138   POTASSIUM 4.6 4.8   CHLORIDE 101 101   CO2 23.0 25.0   ANION GAP 11.0 12.0   BUN 41* 48*   CREATININE 1.80* 2.26*   EGFR 29.8* 22.7*   GLUCOSE 147* 134*   CALCIUM 9.5 10.3   MAGNESIUM  --  1.8   HEMOGLOBIN A1C 6.90*  --    TSH 0.870  --          Lab 08/15/22  2043   TOTAL PROTEIN 7.9   ALBUMIN 4.20   GLOBULIN 3.7   ALT (SGPT) 10   AST (SGOT) 15   BILIRUBIN 0.2   ALK PHOS 53         Lab 08/16/22  0501 08/15/22  2043   TROPONIN T <0.010 <0.010                 Brief Urine Lab Results  (Last result in the past 365 days)      Color   Clarity   Blood   Leuk Est   Nitrite   Protein   CREAT   Urine HCG        08/16/22 0019             82.1         08/16/22 0019 Yellow   Clear   Negative   Small (1+)   Negative   Negative               Microbiology Results (last 10 days)     Procedure Component Value - Date/Time    COVID PRE-OP / PRE-PROCEDURE SCREENING ORDER (NO ISOLATION) - Swab, Nasopharynx [[id number]]  (Normal) Collected: 08/16/22 0259    Lab Status: Final result Specimen: Swab from Nasopharynx Updated: 08/16/22 0330    Narrative:      The following orders were created for panel order COVID PRE-OP / PRE-PROCEDURE SCREENING ORDER (NO ISOLATION) - Swab, Nasopharynx.  Procedure                               Abnormality         Status                     ---------                               -----------         ------                     COVID-19 and FLU A/B PCR...[[id number]]  Normal              Final result                 Please view results for these  tests on the individual orders.    COVID-19 and FLU A/B PCR - Swab, Nasopharynx [[id number]]  (Normal) Collected: 08/16/22 0259    Lab Status: Final result Specimen: Swab from Nasopharynx Updated: 08/16/22 0330     COVID19 Not Detected     Influenza A PCR Not Detected     Influenza B PCR Not Detected    Narrative:      Fact sheet for providers: https://www.fda.gov/media/660417/download    Fact sheet for patients: https://www.fda.gov/media/132024/download    Test performed by PCR.          CT Chest Without Contrast Diagnostic    Result Date: 8/15/2022  DATE OF EXAM: 8/15/2022 5:26 PM  PROCEDURE: CT CHEST WO CONTRAST DIAGNOSTIC-  INDICATIONS: R06.09; R06.02-Shortness of breath today. History of right thoracotomy surgery  COMPARISON: Plain film imaging study chest performed on 02/23/2005 and 01/12/2004  TECHNIQUE: Routine transaxial slices were obtained through the chest without the administration of intravenous contrast. Reconstructed coronal and sagittal images were also obtained. Automated exposure control and iterative construction methods were used.  The radiation dose reduction device was turned on for each scan per the ALARA (As Low as Reasonably Achievable) protocol.  FINDINGS: CT the chest without contrast reveals no evidence of mass or adenopathy in the base the neck or in the periclavicular soft tissues or the axillary soft tissues. No evidence of mass or adenopathy in the mediastinum or in the right or left pulmonary rios. Heart size is mildly enlarged the pulmonary vascularity is mildly increased and this might be caused by mild congestive heart failure. Clinical correlation would BE helpful. No evidence of pericardial effusion or pericardial thickening. No evidence of hiatal hernia. Mild calcified atherosclerotic plaque is seen in the left coronary artery. Surgical clips is seen in the right upper quadrant the abdomen consistent with cholecystectomy. No evidence of liver mass. No evidence of dilatation of  the bile ducts. No evidence of mass or adenopathy or ascites in the abdomen. No evidence of fracture or metastatic disease in the thoracic spine there is a 1.5 cm x 1 cm in size small focal area of increased lung parenchymal density in the anterior medial aspect of the right middle lobe of the long probably benign in caused by small area of inflammation or infection or scarring or granulomas and less likely caused by mass. CT the chest and 3 months would confirm this. There are bilateral diffuse increased lung markings consistent with chronic lung disease       1. Small 1.5 cm x 1 symmetric size focal area of increased density in the lung parenchyma in the anterior medial aspect of the right middle lobe along is probably benign in caused by small area of inflammation or infection or scarring or granulomas and less likely caused by mass. Follow-up CT the chest and 3 months would confirm this. 2. Bilateral diffuse increased lung markings consistent with chronic lung disease and possibly mild congestive heart failure. Clinical correlation would BE helpful. 3. The heart is mildly enlarged.  This report was finalized on 8/15/2022 5:58 PM by Chinmay Francis MD.      NM Lung Scan Perfusion Particulate    Result Date: 8/16/2022  DATE OF EXAM: 8/16/2022 10:23 AM  PROCEDURE: NM LUNG SCAN PERFUSION PARTICULATE-  INDICATIONS: dyspnea. elevated d-dimer.; N17.9-Acute kidney failure, unspecified  COMPARISON: Portable chest 8/15/2022  TECHNIQUE: 5.33 mCi of technetium 99m labeled MAA was administered intravenously for the perfusion portion of the pulmonary exam. Scintigraphic images of the lungs were obtained in multiple projections to assess perfusion.  FINDINGS: There is generally uniform radiotracer distribution throughout the lungs. No well-defined segmental or subsegmental defects are identified. There is slight heterogeneity corresponding to the hilar structures. Exam is considered low probability for pulmonary embolic  disease.      Low probability for pulmonary embolic disease.  This report was finalized on 8/16/2022 3:45 PM by Dr. Saul Casillas MD.      XR Chest 1 View    Result Date: 8/15/2022  DATE OF EXAM: 8/15/2022 9:08 PM  PROCEDURE: XR CHEST 1 VW-  INDICATIONS: Weak/Dizzy/AMS triage protocol cough and shortness of air today  COMPARISON: No comparisons available.  TECHNIQUE: Single radiographic AP view of the chest was obtained.  FINDINGS: Single frontal view chest reveals heart and mediastinum are normal no evidence of focal infiltrate or pleural effusion or pneumothorax or mass right or left lungs. There is mild chronic-appearing elevation right hemidiaphragm. There is mild chronic-appearing deformity of the posterior lateral aspect of the right sixth rib.       1. No acute disease in the chest  This report was finalized on 8/15/2022 9:13 PM by Chinmay Francis MD.            Discharge Details        Discharge Medications      Continue These Medications      Instructions Start Date   aspirin 81 MG chewable tablet   81 mg, Oral, Daily      Cyanocobalamin 1000 MCG/ML kit   Injection      fenofibrate 145 MG tablet  Commonly known as: TRICOR   145 mg, Oral, Daily      losartan 50 MG tablet  Commonly known as: COZAAR   50 mg, Oral, Daily      metFORMIN 500 MG tablet  Commonly known as: GLUCOPHAGE   500 mg, Oral, 2 Times Daily With Meals      pantoprazole 40 MG EC tablet  Commonly known as: PROTONIX   40 mg, Oral, Daily      rosuvastatin 20 MG tablet  Commonly known as: CRESTOR   20 mg, Oral, Daily      tamsulosin 0.4 MG capsule 24 hr capsule  Commonly known as: FLOMAX   1 capsule, Oral, Daily         Stop These Medications    hydroCHLOROthiazide 25 MG tablet  Commonly known as: HYDRODIURIL     levoFLOXacin 750 MG tablet  Commonly known as: LEVAQUIN            Allergies   Allergen Reactions   • Codeine Other (See Comments) and GI Intolerance   • Nitrofurantoin Hives   • Sulfamethoxazole-Trimethoprim Rash         Discharge  Disposition:  Home or Self Care    Diet:  Hospital:  Diet Order   Procedures   • Diet Regular; Consistent Carbohydrate       Activity:  Activity Instructions     Activity as Tolerated          CODE STATUS:    Code Status and Medical Interventions:   Ordered at: 08/16/22 0438     Code Status (Patient has no pulse and is not breathing):    CPR (Attempt to Resuscitate)     Medical Interventions (Patient has pulse or is breathing):    Full Support       No future appointments.    Additional Instructions for the Follow-ups that You Need to Schedule     Discharge Follow-up with PCP   As directed       Currently Documented PCP:    Graciela Hargrove MD    PCP Phone Number:    [phone]     Follow Up Details: PCP 1 week with KIMBERLY Conde MD  08/17/22      Time Spent on Discharge:  I spent 25 minutes on this discharge activity which included: face-to-face encounter with the patient, reviewing the data in the system, coordination of the care with the nursing staff as well as consultants, documentation, and entering orders.

## 2022-08-17 NOTE — CASE MANAGEMENT/SOCIAL WORK
Discharge Planning Assessment  [facility]     Patient Name: Laina Thompson  MRN: [id number]  Today's Date: 8/17/2022    Admit Date: 8/16/2022     Discharge Needs Assessment     Row Name 08/17/22 0845       Living Environment    People in Home spouse    Current Living Arrangements home    Primary Care Provided by self    Provides Primary Care For no one    Family Caregiver if Needed none    Able to Return to Prior Arrangements yes       Resource/Environmental Concerns    Resource/Environmental Concerns none       Transition Planning    Patient/Family Anticipates Transition to home with family    Patient/Family Anticipated Services at Transition none    Transportation Anticipated family or friend will provide       Discharge Needs Assessment    Readmission Within the Last 30 Days no previous admission in last 30 days    Equipment Currently Used at Home none    Concerns to be Addressed no discharge needs identified;denies needs/concerns at this time    Anticipated Changes Related to Illness none    Equipment Needed After Discharge none               Discharge Plan     Row Name 08/17/22 0846       Plan    Plan Home    Patient/Family in Agreement with Plan yes    Plan Comments Called & spoke with Mrs Thompson. She lives with her spouse in [place] [facility]. At baseline, is ind with ADL's/mobility. Denies DME/HH/Rehab/O2. Is being DC today. No needs voiced. Plan is home and spouse will transport.    Final Discharge Disposition Code 01 - home or self-care              Continued Care and Services - Admitted Since 8/16/2022    Coordination has not been started for this encounter.       Expected Discharge Date and Time     Expected Discharge Date Expected Discharge Time    Aug 17, 2022          Demographic Summary     Row Name 08/17/22 0845       General Information    Admission Type observation    General Information Comments Verified PCP is Dr Hargrove. Primary insurance is [facility] Medicare. Has drug coverage.       Contact  Information    Permission Granted to Share Info With     Contact Information Obtained for                Functional Status     Row Name 08/17/22 0845       Functional Status    Usual Activity Tolerance good    Current Activity Tolerance good       Functional Status, IADL    Medications independent    Meal Preparation independent    Housekeeping independent    Laundry independent    Shopping independent               Psychosocial    No documentation.                Abuse/Neglect    No documentation.                Legal    No documentation.                Substance Abuse    No documentation.                Patient Forms    No documentation.                   Jennifer Rodríguez RN

## 2022-08-17 NOTE — PLAN OF CARE
Goal Outcome Evaluation:          Problem: Adult Inpatient Plan of Care  Goal: Absence of Hospital-Acquired Illness or Injury  Intervention: Identify and Manage Fall Risk  Recent Flowsheet Documentation  Taken 8/17/2022 0753 by Gabriel Gibbons RN  Safety Promotion/Fall Prevention:   activity supervised   clutter free environment maintained   assistive device/personal items within reach   toileting scheduled   safety round/check completed   room organization consistent   nonskid shoes/slippers when out of bed   gait belt   fall prevention program maintained  Taken 8/16/2022 1908 by Gabriel Gibbons RN  Safety Promotion/Fall Prevention: activity supervised  Intervention: Prevent Skin Injury  Recent Flowsheet Documentation  Taken 8/17/2022 0753 by Gabriel Gibbons RN  Body Position: position changed independently  Taken 8/16/2022 1908 by Gabriel Gibbons RN  Body Position: sitting up in bed  Intervention: Prevent and Manage VTE (Venous Thromboembolism) Risk  Recent Flowsheet Documentation  Taken 8/17/2022 0753 by Gabriel Gibbons RN  VTE Prevention/Management:   bilateral   sequential compression devices on  Goal: Optimal Comfort and Wellbeing  Intervention: Provide Person-Centered Care  Recent Flowsheet Documentation  Taken 8/17/2022 0753 by Gabriel Gibbons RN  Trust Relationship/Rapport:   care explained   questions encouraged   questions answered

## 2022-08-17 NOTE — PLAN OF CARE
Goal Outcome Evaluation:  Plan of Care Reviewed With: patient        Progress: no change     AOx4, VSS, RA, standby assist to bathroom, voiding spontaneously, n/o of pain

## 2022-08-25 ENCOUNTER — HOSPITAL ENCOUNTER (EMERGENCY)
Facility: HOSPITAL | Age: 72
Discharge: HOME | End: 2022-08-25
Attending: EMERGENCY MEDICINE | Admitting: EMERGENCY MEDICINE

## 2022-08-25 ENCOUNTER — APPOINTMENT (OUTPATIENT)
Dept: CARDIOLOGY | Facility: HOSPITAL | Age: 72
End: 2022-08-25

## 2022-08-25 VITALS
BODY MASS INDEX: 24.43 KG/M2 | WEIGHT: 152 LBS | HEIGHT: 66 IN | TEMPERATURE: 98.2 F | RESPIRATION RATE: 20 BRPM | DIASTOLIC BLOOD PRESSURE: 86 MMHG | OXYGEN SATURATION: 99 % | HEART RATE: 105 BPM | SYSTOLIC BLOOD PRESSURE: 144 MMHG

## 2022-08-25 DIAGNOSIS — Z87.448 HISTORY OF RENAL FAILURE: ICD-10-CM

## 2022-08-25 DIAGNOSIS — R60.0 EDEMA OF LEFT LOWER EXTREMITY: Primary | ICD-10-CM

## 2022-08-25 DIAGNOSIS — M25.562 ACUTE PAIN OF LEFT KNEE: ICD-10-CM

## 2022-08-25 LAB
ALBUMIN SERPL-MCNC: 4.4 G/DL (ref 3.5–5.2)
ALBUMIN/GLOB SERPL: 1.2 G/DL
ALP SERPL-CCNC: 46 U/L (ref 39–117)
ALT SERPL W P-5'-P-CCNC: 12 U/L (ref 1–33)
ANION GAP SERPL CALCULATED.3IONS-SCNC: 12 MMOL/L (ref 5–15)
AST SERPL-CCNC: 17 U/L (ref 1–32)
BASOPHILS # BLD AUTO: 0.06 10*3/MM3 (ref 0–0.2)
BASOPHILS NFR BLD AUTO: 0.6 % (ref 0–1.5)
BH CV LOWER VASCULAR LEFT COMMON FEMORAL AUGMENT: NORMAL
BH CV LOWER VASCULAR LEFT COMMON FEMORAL COMPETENT: NORMAL
BH CV LOWER VASCULAR LEFT COMMON FEMORAL COMPRESS: NORMAL
BH CV LOWER VASCULAR LEFT COMMON FEMORAL PHASIC: NORMAL
BH CV LOWER VASCULAR LEFT COMMON FEMORAL SPONT: NORMAL
BH CV LOWER VASCULAR LEFT DISTAL FEMORAL COMPRESS: NORMAL
BH CV LOWER VASCULAR LEFT GASTRONEMIUS COMPRESS: NORMAL
BH CV LOWER VASCULAR LEFT GREATER SAPH AK COMPRESS: NORMAL
BH CV LOWER VASCULAR LEFT GREATER SAPH BK COMPRESS: NORMAL
BH CV LOWER VASCULAR LEFT LESSER SAPH COMPRESS: NORMAL
BH CV LOWER VASCULAR LEFT MID FEMORAL AUGMENT: NORMAL
BH CV LOWER VASCULAR LEFT MID FEMORAL COMPETENT: NORMAL
BH CV LOWER VASCULAR LEFT MID FEMORAL COMPRESS: NORMAL
BH CV LOWER VASCULAR LEFT MID FEMORAL PHASIC: NORMAL
BH CV LOWER VASCULAR LEFT MID FEMORAL SPONT: NORMAL
BH CV LOWER VASCULAR LEFT PERONEAL COMPRESS: NORMAL
BH CV LOWER VASCULAR LEFT POPLITEAL AUGMENT: NORMAL
BH CV LOWER VASCULAR LEFT POPLITEAL COMPETENT: NORMAL
BH CV LOWER VASCULAR LEFT POPLITEAL COMPRESS: NORMAL
BH CV LOWER VASCULAR LEFT POPLITEAL PHASIC: NORMAL
BH CV LOWER VASCULAR LEFT POPLITEAL SPONT: NORMAL
BH CV LOWER VASCULAR LEFT POSTERIOR TIBIAL COMPRESS: NORMAL
BH CV LOWER VASCULAR LEFT PROFUNDA FEMORAL COMPRESS: NORMAL
BH CV LOWER VASCULAR LEFT PROXIMAL FEMORAL COMPRESS: NORMAL
BH CV LOWER VASCULAR LEFT SAPHENOFEMORAL JUNCTION COMPRESS: NORMAL
BH CV LOWER VASCULAR RIGHT COMMON FEMORAL AUGMENT: NORMAL
BH CV LOWER VASCULAR RIGHT COMMON FEMORAL COMPETENT: NORMAL
BH CV LOWER VASCULAR RIGHT COMMON FEMORAL COMPRESS: NORMAL
BH CV LOWER VASCULAR RIGHT COMMON FEMORAL PHASIC: NORMAL
BH CV LOWER VASCULAR RIGHT COMMON FEMORAL SPONT: NORMAL
BILIRUB SERPL-MCNC: 0.3 MG/DL (ref 0–1.2)
BUN SERPL-MCNC: 21 MG/DL (ref 8–23)
BUN/CREAT SERPL: 20 (ref 7–25)
CALCIUM SPEC-SCNC: 10.2 MG/DL (ref 8.6–10.5)
CHLORIDE SERPL-SCNC: 105 MMOL/L (ref 98–107)
CO2 SERPL-SCNC: 26 MMOL/L (ref 22–29)
CREAT SERPL-MCNC: 1.05 MG/DL (ref 0.57–1)
DEPRECATED RDW RBC AUTO: 42.5 FL (ref 37–54)
EGFRCR SERPLBLD CKD-EPI 2021: 56.9 ML/MIN/1.73
EOSINOPHIL # BLD AUTO: 0.06 10*3/MM3 (ref 0–0.4)
EOSINOPHIL NFR BLD AUTO: 0.6 % (ref 0.3–6.2)
ERYTHROCYTE [DISTWIDTH] IN BLOOD BY AUTOMATED COUNT: 12.7 % (ref 12.3–15.4)
GLOBULIN UR ELPH-MCNC: 3.6 GM/DL
GLUCOSE SERPL-MCNC: 118 MG/DL (ref 65–99)
HCT VFR BLD AUTO: 36.7 % (ref 34–46.6)
HGB BLD-MCNC: 12.1 G/DL (ref 12–15.9)
HOLD SPECIMEN: NORMAL
HOLD SPECIMEN: NORMAL
IMM GRANULOCYTES # BLD AUTO: 0.03 10*3/MM3 (ref 0–0.05)
IMM GRANULOCYTES NFR BLD AUTO: 0.3 % (ref 0–0.5)
INR PPP: 1.01 (ref 0.84–1.13)
LYMPHOCYTES # BLD AUTO: 4.64 10*3/MM3 (ref 0.7–3.1)
LYMPHOCYTES NFR BLD AUTO: 44.8 % (ref 19.6–45.3)
MAXIMAL PREDICTED HEART RATE: 149 BPM
MCH RBC QN AUTO: 30.6 PG (ref 26.6–33)
MCHC RBC AUTO-ENTMCNC: 33 G/DL (ref 31.5–35.7)
MCV RBC AUTO: 92.7 FL (ref 79–97)
MONOCYTES # BLD AUTO: 0.61 10*3/MM3 (ref 0.1–0.9)
MONOCYTES NFR BLD AUTO: 5.9 % (ref 5–12)
NEUTROPHILS NFR BLD AUTO: 4.95 10*3/MM3 (ref 1.7–7)
NEUTROPHILS NFR BLD AUTO: 47.8 % (ref 42.7–76)
NRBC BLD AUTO-RTO: 0 /100 WBC (ref 0–0.2)
PLATELET # BLD AUTO: 327 10*3/MM3 (ref 140–450)
PMV BLD AUTO: 8.5 FL (ref 6–12)
POTASSIUM SERPL-SCNC: 4.6 MMOL/L (ref 3.5–5.2)
PROT SERPL-MCNC: 8 G/DL (ref 6–8.5)
PROTHROMBIN TIME: 13.2 SECONDS (ref 11.4–14.4)
RBC # BLD AUTO: 3.96 10*6/MM3 (ref 3.77–5.28)
SODIUM SERPL-SCNC: 143 MMOL/L (ref 136–145)
STRESS TARGET HR: 127 BPM
WBC NRBC COR # BLD: 10.35 10*3/MM3 (ref 3.4–10.8)
WHOLE BLOOD HOLD COAG: NORMAL
WHOLE BLOOD HOLD SPECIMEN: NORMAL

## 2022-08-25 PROCEDURE — 99282 EMERGENCY DEPT VISIT SF MDM: CPT

## 2022-08-25 PROCEDURE — 80053 COMPREHEN METABOLIC PANEL: CPT

## 2022-08-25 PROCEDURE — 85610 PROTHROMBIN TIME: CPT

## 2022-08-25 PROCEDURE — 85025 COMPLETE CBC W/AUTO DIFF WBC: CPT

## 2022-08-25 PROCEDURE — 93971 EXTREMITY STUDY: CPT

## 2022-08-25 PROCEDURE — 93971 EXTREMITY STUDY: CPT | Performed by: INTERNAL MEDICINE

## 2022-08-25 PROCEDURE — 36415 COLL VENOUS BLD VENIPUNCTURE: CPT

## 2022-08-26 LAB — HOLD SPECIMEN: NORMAL

## 2022-08-26 NOTE — ED PROVIDER NOTES
Subjective   71-year-old female presents emergency department today after being sent by her primary care doctor to rule out a DVT.  She had a hospitalization about a week ago for acute kidney injury.  She states her kidneys seem to have recovered she went back to her family doctor today for follow-up she was post to have blood rechecked and they noticed that her left leg was swollen.  She had a negative VQ scan while in the hospital.  They sent her over to the emergency department to have a duplex Doppler to rule out DVT.  She has no prior history of DVT he has no clotting disorder.  She has no chest pain at this time.  She had been on anti-inflammatories for knee and back pain.  Currently not on any of those.  She said no nausea or vomiting.  She has no other complaints.      History provided by:  Patient and spouse   used: No    Leg Pain  Location:  Leg  Time since incident:  2 days  Injury: no    Leg location:  L lower leg  Pain details:     Quality:  Dull    Radiates to:  Does not radiate    Severity:  Mild    Onset quality:  Gradual    Timing:  Constant  Chronicity:  New  Dislocation: no    Foreign body present:  No foreign bodies  Tetanus status:  Up to date  Prior injury to area:  No  Relieved by:  Nothing  Worsened by:  Nothing  Ineffective treatments:  None tried  Associated symptoms: swelling    Associated symptoms: no back pain, no fatigue, no fever, no itching, no muscle weakness, no neck pain, no numbness and no stiffness        Review of Systems   Constitutional: Negative for fatigue and fever.   Respiratory: Negative for chest tightness, shortness of breath and wheezing.    Cardiovascular: Negative for chest pain and palpitations.   Musculoskeletal: Negative for back pain, neck pain and stiffness.   Skin: Negative for itching, pallor and rash.   Psychiatric/Behavioral: Negative.    All other systems reviewed and are negative.      Past Medical History:   Diagnosis Date   • Diabetes  (Formerly [facility])    • GERD (gastroesophageal reflux disease)    • Hypertension        Allergies   Allergen Reactions   • Codeine Other (See Comments) and GI Intolerance   • Nitrofurantoin Hives   • Sulfamethoxazole-Trimethoprim Rash       Past Surgical History:   Procedure Laterality Date   • APPENDECTOMY     • CHOLECYSTECTOMY     • HYSTERECTOMY     • LUNG LOBECTOMY         Family History   Problem Relation Age of Onset   • Cancer Father    • Hyperlipidemia Father    • Heart disease Father    • Diabetes Father        Social History     Socioeconomic History   • Marital status:    Tobacco Use   • Smoking status: Never Smoker   • Smokeless tobacco: Never Used           Objective   Physical Exam  Vitals and nursing note reviewed.   Constitutional:       General: She is not in acute distress.     Appearance: She is well-developed. She is not diaphoretic.   HENT:      Head: Normocephalic and atraumatic.      Nose: Nose normal.   Eyes:      General: No scleral icterus.     Conjunctiva/sclera: Conjunctivae normal.   Cardiovascular:      Heart sounds: No murmur heard.  Pulmonary:      Effort: Pulmonary effort is normal. No respiratory distress.   Musculoskeletal:      Cervical back: Normal range of motion and neck supple.      Comments: Tenderness of the left medial joint line.  There is no redness or induration.  The left calf is mildly edematous.  No point tenderness no open wounds.  No redness induration there is well.   Skin:     General: Skin is warm and dry.   Neurological:      Mental Status: She is alert and oriented to person, place, and time.   Psychiatric:         Behavior: Behavior normal.         Procedures           ED Course                                 Recent Results (from the past 24 hour(s))   Duplex Venous Lower Extremity LEFT    Collection Time: 08/25/22  7:44 PM   Result Value Ref Range    Target HR (85%) 127 bpm    Max. Pred. HR (100%) 149 bpm    Right Common Femoral Spont Y     Right Common Femoral  Phasic Y     Right Common Femoral Augment Y     Right Common Femoral Competent Y     Right Common Femoral Compress C     Left Common Femoral Spont Y     Left Common Femoral Phasic Y     Left Common Femoral Augment Y     Left Common Femoral Competent Y     Left Common Femoral Compress C     Left Saphenofemoral Junction Compress C     Left Profunda Femoral Compress C     Left Proximal Femoral Compress C     Left Mid Femoral Spont Y     Left Mid Femoral Phasic Y     Left Mid Femoral Augment Y     Left Mid Femoral Competent Y     Left Mid Femoral Compress C     Left Distal Femoral Compress C     Left Popliteal Spont Y     Left Popliteal Phasic Y     Left Popliteal Augment Y     Left Popliteal Competent Y     Left Popliteal Compress C     Left Posterior Tibial Compress C     Left Peroneal Compress C     Left Gastronemius Compress C     Left Greater Saph AK Compress C     Left Greater Saph BK Compress C     Left Lesser Saph Compress C    Comprehensive Metabolic Panel    Collection Time: 08/25/22  8:46 PM    Specimen: Blood   Result Value Ref Range    Glucose 118 (H) 65 - 99 mg/dL    BUN 21 8 - 23 mg/dL    Creatinine 1.05 (H) 0.57 - 1.00 mg/dL    Sodium 143 136 - 145 mmol/L    Potassium 4.6 3.5 - 5.2 mmol/L    Chloride 105 98 - 107 mmol/L    CO2 26.0 22.0 - 29.0 mmol/L    Calcium 10.2 8.6 - 10.5 mg/dL    Total Protein 8.0 6.0 - 8.5 g/dL    Albumin 4.40 3.50 - 5.20 g/dL    ALT (SGPT) 12 1 - 33 U/L    AST (SGOT) 17 1 - 32 U/L    Alkaline Phosphatase 46 39 - 117 U/L    Total Bilirubin 0.3 0.0 - 1.2 mg/dL    Globulin 3.6 gm/dL    A/G Ratio 1.2 g/dL    BUN/Creatinine Ratio 20.0 7.0 - 25.0    Anion Gap 12.0 5.0 - 15.0 mmol/L    eGFR 56.9 (L) >60.0 mL/min/1.73   Protime-INR    Collection Time: 08/25/22  8:46 PM    Specimen: Blood   Result Value Ref Range    Protime 13.2 11.4 - 14.4 Seconds    INR 1.01 0.84 - 1.13   Green Top (Gel)    Collection Time: 08/25/22  8:46 PM   Result Value Ref Range    Extra Tube Hold for add-ons.   "  Lavender Top    Collection Time: 08/25/22  8:46 PM   Result Value Ref Range    Extra Tube hold for add-on    Gold Top - SST    Collection Time: 08/25/22  8:46 PM   Result Value Ref Range    Extra Tube Hold for add-ons.    Light Blue Top    Collection Time: 08/25/22  8:46 PM   Result Value Ref Range    Extra Tube Hold for add-ons.    CBC Auto Differential    Collection Time: 08/25/22  8:46 PM    Specimen: Blood   Result Value Ref Range    WBC 10.35 3.40 - 10.80 10*3/mm3    RBC 3.96 3.77 - 5.28 10*6/mm3    Hemoglobin 12.1 12.0 - 15.9 g/dL    Hematocrit 36.7 34.0 - 46.6 %    MCV 92.7 79.0 - 97.0 fL    MCH 30.6 26.6 - 33.0 pg    MCHC 33.0 31.5 - 35.7 g/dL    RDW 12.7 12.3 - 15.4 %    RDW-SD 42.5 37.0 - 54.0 fl    MPV 8.5 6.0 - 12.0 fL    Platelets 327 140 - 450 10*3/mm3    Neutrophil % 47.8 42.7 - 76.0 %    Lymphocyte % 44.8 19.6 - 45.3 %    Monocyte % 5.9 5.0 - 12.0 %    Eosinophil % 0.6 0.3 - 6.2 %    Basophil % 0.6 0.0 - 1.5 %    Immature Grans % 0.3 0.0 - 0.5 %    Neutrophils, Absolute 4.95 1.70 - 7.00 10*3/mm3    Lymphocytes, Absolute 4.64 (H) 0.70 - 3.10 10*3/mm3    Monocytes, Absolute 0.61 0.10 - 0.90 10*3/mm3    Eosinophils, Absolute 0.06 0.00 - 0.40 10*3/mm3    Basophils, Absolute 0.06 0.00 - 0.20 10*3/mm3    Immature Grans, Absolute 0.03 0.00 - 0.05 10*3/mm3    nRBC 0.0 0.0 - 0.2 /100 WBC     Note: In addition to lab results from this visit, the labs listed above may include labs taken at another facility or during a different encounter within the last 24 hours. Please correlate lab times with ED admission and discharge times for further clarification of the services performed during this visit.    No orders to display     Vitals:    08/25/22 1743   BP: 144/86   BP Location: Left arm   Patient Position: Sitting   Pulse: 105   Resp: 20   Temp: 98.2 °F (36.8 °C)   TempSrc: Oral   SpO2: 99%   Weight: 68.9 kg (152 lb)   Height: 167.6 cm (66\")     Medications - No data to display  ECG/EMG Results (last 24 hours)  "    ** No results found for the last 24 hours. **        No orders to display                 MDM  Number of Diagnoses or Management Options  Acute pain of left knee: new and requires workup  Edema of left lower extremity: new and requires workup  History of renal failure: new and requires workup     Amount and/or Complexity of Data Reviewed  Clinical lab tests: reviewed and ordered  Tests in the radiology section of CPT®: ordered and reviewed  Tests in the medicine section of CPT®: ordered and reviewed  Decide to obtain previous medical records or to obtain history from someone other than the patient: yes  Discuss the patient with other providers: yes    Patient Progress  Patient progress: stable      Final diagnoses:   Edema of left lower extremity   Acute pain of left knee   History of renal failure       ED Disposition  ED Disposition     ED Disposition   Discharge    Condition   Stable    Comment   --             Graciela Hargrove MD  [id number] [facility] [address] [phone]      Call for appointment         Medication List      No changes were made to your prescriptions during this visit.          Ubaldo Maldonado PA  08/25/22 [id number]

## 2022-09-06 LAB — CREAT BLDA-MCNC: 3 MG/DL (ref 0.6–1.3)

## 2024-07-15 NOTE — PLAN OF CARE
Goal Outcome Evaluation:  Plan of Care Reviewed With: patient      Admitted from ED, AOx4, VSS, RA, AOx4, up with standby, steady gait,  at bedside, no c/o of pain or discomfort, voiding spontaneously    No